# Patient Record
Sex: FEMALE | Race: WHITE | Employment: OTHER | URBAN - METROPOLITAN AREA
[De-identification: names, ages, dates, MRNs, and addresses within clinical notes are randomized per-mention and may not be internally consistent; named-entity substitution may affect disease eponyms.]

---

## 2017-07-08 ENCOUNTER — HOSPITAL ENCOUNTER (EMERGENCY)
Age: 82
Discharge: HOME | End: 2017-07-08
Payer: MEDICARE

## 2017-07-08 VITALS
TEMPERATURE: 98.2 F | BODY MASS INDEX: 23.92 KG/M2 | HEART RATE: 89 BPM | DIASTOLIC BLOOD PRESSURE: 63 MMHG | HEIGHT: 62 IN | RESPIRATION RATE: 16 BRPM | OXYGEN SATURATION: 99 % | SYSTOLIC BLOOD PRESSURE: 159 MMHG | WEIGHT: 130 LBS

## 2017-07-08 DIAGNOSIS — L23.9 ALLERGIC CONTACT DERMATITIS, UNSPECIFIED TRIGGER: Primary | ICD-10-CM

## 2017-07-08 PROCEDURE — 96372 THER/PROPH/DIAG INJ SC/IM: CPT

## 2017-07-08 PROCEDURE — 6360000002 HC RX W HCPCS: Performed by: NURSE PRACTITIONER

## 2017-07-08 PROCEDURE — 99282 EMERGENCY DEPT VISIT SF MDM: CPT

## 2017-07-08 RX ORDER — LEVOTHYROXINE SODIUM 0.07 MG/1
75 TABLET ORAL DAILY
COMMUNITY

## 2017-07-08 RX ORDER — TRIAMCINOLONE ACETONIDE 40 MG/ML
80 INJECTION, SUSPENSION INTRA-ARTICULAR; INTRAMUSCULAR ONCE
Status: COMPLETED | OUTPATIENT
Start: 2017-07-08 | End: 2017-07-08

## 2017-07-08 RX ORDER — PREDNISONE 10 MG/1
TABLET ORAL
Qty: 30 TABLET | Refills: 0 | Status: SHIPPED | OUTPATIENT
Start: 2017-07-08

## 2017-07-08 RX ADMIN — TRIAMCINOLONE ACETONIDE 80 MG: 40 INJECTION, SUSPENSION INTRA-ARTICULAR; INTRAMUSCULAR at 01:27

## 2023-10-13 ENCOUNTER — APPOINTMENT (OUTPATIENT)
Dept: CT IMAGING | Age: 88
DRG: 481 | End: 2023-10-13
Payer: MEDICARE

## 2023-10-13 ENCOUNTER — HOSPITAL ENCOUNTER (INPATIENT)
Age: 88
LOS: 7 days | Discharge: NURSING, REHAB OR LONG-TERM CARE | DRG: 481 | End: 2023-10-20
Attending: SURGERY | Admitting: SURGERY
Payer: MEDICARE

## 2023-10-13 ENCOUNTER — APPOINTMENT (OUTPATIENT)
Dept: GENERAL RADIOLOGY | Age: 88
DRG: 481 | End: 2023-10-13
Payer: MEDICARE

## 2023-10-13 DIAGNOSIS — S72.011A CLOSED SUBCAPITAL FRACTURE OF FEMUR, RIGHT, INITIAL ENCOUNTER (HCC): ICD-10-CM

## 2023-10-13 DIAGNOSIS — S72.001A CLOSED FRACTURE OF RIGHT HIP, INITIAL ENCOUNTER (HCC): Primary | ICD-10-CM

## 2023-10-13 DIAGNOSIS — R94.31 T WAVE INVERSION IN EKG: ICD-10-CM

## 2023-10-13 LAB
ALBUMIN SERPL-MCNC: 4.2 G/DL (ref 3.5–4.6)
ALP SERPL-CCNC: 68 U/L (ref 40–130)
ALT SERPL-CCNC: 13 U/L (ref 0–33)
AMPHET UR QL SCN: NORMAL
ANION GAP SERPL CALCULATED.3IONS-SCNC: 9 MEQ/L (ref 9–15)
AST SERPL-CCNC: 23 U/L (ref 0–35)
BARBITURATES UR QL SCN: NORMAL
BASOPHILS # BLD: 0 K/UL (ref 0–0.2)
BASOPHILS NFR BLD: 0.3 %
BENZODIAZ UR QL SCN: NORMAL
BILIRUB SERPL-MCNC: 1.2 MG/DL (ref 0.2–0.7)
BILIRUB UR QL STRIP: NEGATIVE
BUN SERPL-MCNC: 14 MG/DL (ref 8–23)
CALCIUM SERPL-MCNC: 9.5 MG/DL (ref 8.5–9.9)
CANNABINOIDS UR QL SCN: NORMAL
CHLORIDE SERPL-SCNC: 104 MEQ/L (ref 95–107)
CLARITY UR: CLEAR
CO2 SERPL-SCNC: 28 MEQ/L (ref 20–31)
COCAINE UR QL SCN: NORMAL
COLOR UR: YELLOW
CREAT SERPL-MCNC: 0.87 MG/DL (ref 0.5–0.9)
DRUG SCREEN COMMENT UR-IMP: NORMAL
EOSINOPHIL # BLD: 0 K/UL (ref 0–0.7)
EOSINOPHIL NFR BLD: 0.4 %
ERYTHROCYTE [DISTWIDTH] IN BLOOD BY AUTOMATED COUNT: 12.3 % (ref 11.5–14.5)
FENTANYL SCREEN, URINE: NORMAL
GLOBULIN SER CALC-MCNC: 3 G/DL (ref 2.3–3.5)
GLUCOSE SERPL-MCNC: 101 MG/DL (ref 70–99)
GLUCOSE UR STRIP-MCNC: NEGATIVE MG/DL
HCT VFR BLD AUTO: 46.3 % (ref 37–47)
HGB BLD-MCNC: 14.6 G/DL (ref 12–16)
HGB UR QL STRIP: NEGATIVE
KETONES UR STRIP-MCNC: 15 MG/DL
LEUKOCYTE ESTERASE UR QL STRIP: NEGATIVE
LYMPHOCYTES # BLD: 1.5 K/UL (ref 1–4.8)
LYMPHOCYTES NFR BLD: 15 %
MAGNESIUM SERPL-MCNC: 2.1 MG/DL (ref 1.7–2.4)
MCH RBC QN AUTO: 29.4 PG (ref 27–31.3)
MCHC RBC AUTO-ENTMCNC: 31.5 % (ref 33–37)
MCV RBC AUTO: 93.3 FL (ref 79.4–94.8)
METHADONE UR QL SCN: NORMAL
MONOCYTES # BLD: 0.6 K/UL (ref 0.2–0.8)
MONOCYTES NFR BLD: 6.1 %
NEUTROPHILS # BLD: 7.5 K/UL (ref 1.4–6.5)
NEUTS SEG NFR BLD: 77.6 %
NITRITE UR QL STRIP: NEGATIVE
OPIATES UR QL SCN: NORMAL
OXYCODONE UR QL SCN: NORMAL
PCP UR QL SCN: NORMAL
PH UR STRIP: 6.5 [PH] (ref 5–9)
PLATELET # BLD AUTO: 212 K/UL (ref 130–400)
POTASSIUM SERPL-SCNC: 4.3 MEQ/L (ref 3.4–4.9)
PROPOXYPH UR QL SCN: NORMAL
PROT SERPL-MCNC: 7.2 G/DL (ref 6.3–8)
PROT UR STRIP-MCNC: ABNORMAL MG/DL
RBC # BLD AUTO: 4.96 M/UL (ref 4.2–5.4)
SODIUM SERPL-SCNC: 141 MEQ/L (ref 135–144)
SP GR UR STRIP: 1.01 (ref 1–1.03)
TROPONIN, HIGH SENSITIVITY: 15 NG/L (ref 0–19)
URINE REFLEX TO CULTURE: ABNORMAL
UROBILINOGEN UR STRIP-ACNC: 1 E.U./DL
WBC # BLD AUTO: 9.7 K/UL (ref 4.8–10.8)

## 2023-10-13 PROCEDURE — 80307 DRUG TEST PRSMV CHEM ANLYZR: CPT

## 2023-10-13 PROCEDURE — 83735 ASSAY OF MAGNESIUM: CPT

## 2023-10-13 PROCEDURE — 72110 X-RAY EXAM L-2 SPINE 4/>VWS: CPT

## 2023-10-13 PROCEDURE — 80053 COMPREHEN METABOLIC PANEL: CPT

## 2023-10-13 PROCEDURE — 2580000003 HC RX 258: Performed by: PHYSICIAN ASSISTANT

## 2023-10-13 PROCEDURE — 73552 X-RAY EXAM OF FEMUR 2/>: CPT

## 2023-10-13 PROCEDURE — 81003 URINALYSIS AUTO W/O SCOPE: CPT

## 2023-10-13 PROCEDURE — 99285 EMERGENCY DEPT VISIT HI MDM: CPT

## 2023-10-13 PROCEDURE — 84484 ASSAY OF TROPONIN QUANT: CPT

## 2023-10-13 PROCEDURE — 93005 ELECTROCARDIOGRAM TRACING: CPT | Performed by: PHYSICIAN ASSISTANT

## 2023-10-13 PROCEDURE — 6370000000 HC RX 637 (ALT 250 FOR IP): Performed by: PHYSICIAN ASSISTANT

## 2023-10-13 PROCEDURE — 73502 X-RAY EXAM HIP UNI 2-3 VIEWS: CPT

## 2023-10-13 PROCEDURE — 73700 CT LOWER EXTREMITY W/O DYE: CPT

## 2023-10-13 PROCEDURE — 71045 X-RAY EXAM CHEST 1 VIEW: CPT

## 2023-10-13 PROCEDURE — 1210000000 HC MED SURG R&B

## 2023-10-13 PROCEDURE — 85025 COMPLETE CBC W/AUTO DIFF WBC: CPT

## 2023-10-13 PROCEDURE — 36415 COLL VENOUS BLD VENIPUNCTURE: CPT

## 2023-10-13 RX ORDER — SENNA AND DOCUSATE SODIUM 50; 8.6 MG/1; MG/1
1 TABLET, FILM COATED ORAL 2 TIMES DAILY
Status: DISCONTINUED | OUTPATIENT
Start: 2023-10-13 | End: 2023-10-20 | Stop reason: HOSPADM

## 2023-10-13 RX ORDER — OXYCODONE HYDROCHLORIDE 5 MG/1
5 TABLET ORAL EVERY 4 HOURS PRN
Status: DISCONTINUED | OUTPATIENT
Start: 2023-10-13 | End: 2023-10-18

## 2023-10-13 RX ORDER — POLYETHYLENE GLYCOL 3350 17 G/17G
17 POWDER, FOR SOLUTION ORAL DAILY
Status: DISCONTINUED | OUTPATIENT
Start: 2023-10-14 | End: 2023-10-20 | Stop reason: HOSPADM

## 2023-10-13 RX ORDER — SODIUM CHLORIDE 0.9 % (FLUSH) 0.9 %
5-40 SYRINGE (ML) INJECTION EVERY 12 HOURS SCHEDULED
Status: DISCONTINUED | OUTPATIENT
Start: 2023-10-13 | End: 2023-10-20 | Stop reason: HOSPADM

## 2023-10-13 RX ORDER — TRAMADOL HYDROCHLORIDE 50 MG/1
50 TABLET ORAL ONCE
Status: COMPLETED | OUTPATIENT
Start: 2023-10-13 | End: 2023-10-13

## 2023-10-13 RX ORDER — ACETAMINOPHEN 325 MG/1
650 TABLET ORAL EVERY 6 HOURS
Status: DISCONTINUED | OUTPATIENT
Start: 2023-10-13 | End: 2023-10-20 | Stop reason: HOSPADM

## 2023-10-13 RX ORDER — SODIUM CHLORIDE 0.9 % (FLUSH) 0.9 %
5-40 SYRINGE (ML) INJECTION PRN
Status: DISCONTINUED | OUTPATIENT
Start: 2023-10-13 | End: 2023-10-20 | Stop reason: HOSPADM

## 2023-10-13 RX ORDER — ONDANSETRON 4 MG/1
4 TABLET, ORALLY DISINTEGRATING ORAL EVERY 8 HOURS PRN
Status: DISCONTINUED | OUTPATIENT
Start: 2023-10-13 | End: 2023-10-20 | Stop reason: HOSPADM

## 2023-10-13 RX ORDER — ONDANSETRON 2 MG/ML
4 INJECTION INTRAMUSCULAR; INTRAVENOUS EVERY 8 HOURS PRN
Status: DISCONTINUED | OUTPATIENT
Start: 2023-10-13 | End: 2023-10-20 | Stop reason: HOSPADM

## 2023-10-13 RX ORDER — LEVOTHYROXINE SODIUM 0.07 MG/1
75 TABLET ORAL DAILY
Status: DISCONTINUED | OUTPATIENT
Start: 2023-10-14 | End: 2023-10-20 | Stop reason: HOSPADM

## 2023-10-13 RX ORDER — SODIUM CHLORIDE, SODIUM LACTATE, POTASSIUM CHLORIDE, CALCIUM CHLORIDE 600; 310; 30; 20 MG/100ML; MG/100ML; MG/100ML; MG/100ML
INJECTION, SOLUTION INTRAVENOUS CONTINUOUS
Status: DISCONTINUED | OUTPATIENT
Start: 2023-10-13 | End: 2023-10-14

## 2023-10-13 RX ORDER — OXYCODONE HYDROCHLORIDE 5 MG/1
2.5 TABLET ORAL EVERY 4 HOURS PRN
Status: DISCONTINUED | OUTPATIENT
Start: 2023-10-13 | End: 2023-10-18

## 2023-10-13 RX ORDER — SODIUM CHLORIDE 9 MG/ML
INJECTION, SOLUTION INTRAVENOUS PRN
Status: DISCONTINUED | OUTPATIENT
Start: 2023-10-13 | End: 2023-10-20 | Stop reason: HOSPADM

## 2023-10-13 RX ADMIN — TRAMADOL HYDROCHLORIDE 50 MG: 50 TABLET ORAL at 14:39

## 2023-10-13 RX ADMIN — ACETAMINOPHEN 650 MG: 325 TABLET ORAL at 22:37

## 2023-10-13 RX ADMIN — Medication 10 ML: at 22:33

## 2023-10-13 RX ADMIN — SODIUM CHLORIDE, POTASSIUM CHLORIDE, SODIUM LACTATE AND CALCIUM CHLORIDE: 600; 310; 30; 20 INJECTION, SOLUTION INTRAVENOUS at 22:35

## 2023-10-13 ASSESSMENT — ENCOUNTER SYMPTOMS
RHINORRHEA: 0
BACK PAIN: 0
ABDOMINAL PAIN: 0
PHOTOPHOBIA: 0
DIARRHEA: 0
VOMITING: 0
SHORTNESS OF BREATH: 0
SORE THROAT: 0
EYE PAIN: 0
NAUSEA: 0
COUGH: 0

## 2023-10-13 ASSESSMENT — PAIN DESCRIPTION - ORIENTATION
ORIENTATION: RIGHT

## 2023-10-13 ASSESSMENT — PAIN DESCRIPTION - LOCATION
LOCATION: HIP
LOCATION: LEG

## 2023-10-13 ASSESSMENT — PAIN DESCRIPTION - PAIN TYPE: TYPE: ACUTE PAIN

## 2023-10-13 ASSESSMENT — PAIN SCALES - GENERAL
PAINLEVEL_OUTOF10: 1
PAINLEVEL_OUTOF10: 8

## 2023-10-13 ASSESSMENT — PAIN - FUNCTIONAL ASSESSMENT
PAIN_FUNCTIONAL_ASSESSMENT: 0-10
PAIN_FUNCTIONAL_ASSESSMENT: 0-10

## 2023-10-13 ASSESSMENT — PAIN DESCRIPTION - ONSET: ONSET: ON-GOING

## 2023-10-13 ASSESSMENT — PAIN DESCRIPTION - DESCRIPTORS
DESCRIPTORS: THROBBING
DESCRIPTORS: THROBBING

## 2023-10-13 ASSESSMENT — PAIN DESCRIPTION - FREQUENCY: FREQUENCY: CONTINUOUS

## 2023-10-13 NOTE — ED TRIAGE NOTES
Pt c/o fall from standing. Pt c/o pain in rt hip pain. Pt is A&OX4, skin intact, afebrile, breaths are equal and unlabored.

## 2023-10-13 NOTE — ED PROVIDER NOTES
[facility] ED  eMERGENCY dEPARTMENTeNCOUnter      Pt Name: Samuel Chowdhury  MRN: [id number]  [address] 11/3/1929  Date ofevaluation: 10/13/2023  Provider: See Rollins PA-C    CHIEF COMPLAINT       Chief Complaint   Patient presents with    Fall     Rt hip pain          HISTORY OF PRESENT ILLNESS   (Location/Symptom, Timing/Onset,Context/Setting, Quality, Duration, Modifying Factors, Severity)  Note limiting factors. Samuel Chowdhury is a 80 y.o. female who presents to the emergency department witnessed fall. Patient was outside and fell landing on her right hip. She denies any preceding chest pain shortness of breath dizziness or syncope. She did not hit her head. She complains of right hip pain with limited mobility. HPI    NursingNotes were reviewed. REVIEW OF SYSTEMS    (2-9 systems for level 4, 10 or more for level 5)     Review of Systems   Constitutional:  Negative for chills, diaphoresis, fatigue and fever. HENT:  Negative for congestion, rhinorrhea and sore throat. Eyes:  Negative for photophobia and pain. Respiratory:  Negative for cough and shortness of breath. Cardiovascular:  Negative for chest pain and palpitations. Gastrointestinal:  Negative for abdominal pain, diarrhea, nausea and vomiting. Genitourinary:  Negative for dysuria and flank pain. Musculoskeletal:  Positive for arthralgias and gait problem. Negative for back pain. Skin:  Negative for rash. Neurological:  Negative for dizziness, light-headedness and headaches. Psychiatric/Behavioral: Negative. All other systems reviewed and are negative. Except as noted above the remainder of the review of systems was reviewed and negative.        PAST MEDICAL HISTORY     Past Medical History:   Diagnosis Date    Thyroid disease          SURGICALHISTORY       Past Surgical History:   Procedure Laterality Date    FRACTURE SURGERY      R ankle          CURRENT MEDICATIONS       Previous Medications Skin:     General: Skin is warm and dry. Capillary Refill: Capillary refill takes less than 2 seconds. Findings: No rash. Neurological:      Mental Status: She is alert and oriented to person, place, and time. Psychiatric:         Thought Content: Thought content normal.         Judgment: Judgment normal.         RESULTS     EKG: All EKG's are interpreted by the Emergency Department Physician who either signs or Co-signsthis chart in the absence of a cardiologist.    Nsr 72bpm t wave inversions v3,v4 v5 v6 no priors to compare to.  Denies chest pain    RADIOLOGY:   Non-plain filmimages such as CT, Ultrasound and MRI are read by the radiologist. Plain radiographic images are visualized and preliminarily interpreted by the emergency physician with the below findings:    Elijah Johnston subcapital right hip fx will obtain ct for confirmation     Interpretation per the Radiologist below, if available at the time ofthis note:    CT HIP RIGHT WO CONTRAST   Final Result   Subcapital fracture of the right femur with slight impaction         XR HIP 2-3 VW W PELVIS RIGHT   Final Result   Question subcapital fracture of the right femur         XR FEMUR RIGHT (MIN 2 VIEWS)   Final Result   Question fracture of the right femoral neck         XR LUMBAR SPINE (MIN 4 VIEWS)   Final Result   No fracture         XR CHEST PORTABLE    (Results Pending)         ED BEDSIDE ULTRASOUND:   Performed by ED Physician - none    LABS:  Labs Reviewed   CBC WITH AUTO DIFFERENTIAL - Abnormal; Notable for the following components:       Result Value    MCHC 31.5 (*)     Neutrophils Absolute 7.5 (*)     All other components within normal limits   COMPREHENSIVE METABOLIC PANEL - Abnormal; Notable for the following components:    Glucose 101 (*)     Total Bilirubin 1.2 (*)     All other components within normal limits   TROPONIN   MAGNESIUM   URINALYSIS WITH REFLEX TO CULTURE   PROTIME-INR   APTT   URINE DRUG SCREEN   ETHANOL   TROPONIN

## 2023-10-14 ENCOUNTER — ANESTHESIA (OUTPATIENT)
Dept: OPERATING ROOM | Age: 88
End: 2023-10-14
Payer: MEDICARE

## 2023-10-14 ENCOUNTER — ANESTHESIA EVENT (OUTPATIENT)
Dept: OPERATING ROOM | Age: 88
End: 2023-10-14
Payer: MEDICARE

## 2023-10-14 ENCOUNTER — APPOINTMENT (OUTPATIENT)
Dept: GENERAL RADIOLOGY | Age: 88
DRG: 481 | End: 2023-10-14
Payer: MEDICARE

## 2023-10-14 LAB
ABO + RH BLD: NORMAL
ANION GAP SERPL CALCULATED.3IONS-SCNC: 11 MEQ/L (ref 9–15)
APTT PPP: 34.4 SEC (ref 24.4–36.8)
BLD GP AB SCN SERPL QL: NORMAL
BUN SERPL-MCNC: 21 MG/DL (ref 8–23)
CALCIUM SERPL-MCNC: 8.6 MG/DL (ref 8.5–9.9)
CHLORIDE SERPL-SCNC: 103 MEQ/L (ref 95–107)
CO2 SERPL-SCNC: 25 MEQ/L (ref 20–31)
CREAT SERPL-MCNC: 0.88 MG/DL (ref 0.5–0.9)
EKG ATRIAL RATE: 72 BPM
EKG P AXIS: 38 DEGREES
EKG P-R INTERVAL: 184 MS
EKG Q-T INTERVAL: 406 MS
EKG QRS DURATION: 82 MS
EKG QTC CALCULATION (BAZETT): 444 MS
EKG R AXIS: -20 DEGREES
EKG T AXIS: 92 DEGREES
EKG VENTRICULAR RATE: 72 BPM
ERYTHROCYTE [DISTWIDTH] IN BLOOD BY AUTOMATED COUNT: 12.4 % (ref 11.5–14.5)
ETHANOL PERCENT: NORMAL G/DL
ETHANOLAMINE SERPL-MCNC: <10 MG/DL (ref 0–0.08)
GLUCOSE SERPL-MCNC: 102 MG/DL (ref 70–99)
HCT VFR BLD AUTO: 38.9 % (ref 37–47)
HGB BLD-MCNC: 12.5 G/DL (ref 12–16)
INR PPP: 1.2
MCH RBC QN AUTO: 29.3 PG (ref 27–31.3)
MCHC RBC AUTO-ENTMCNC: 32.1 % (ref 33–37)
MCV RBC AUTO: 91.1 FL (ref 79.4–94.8)
PLATELET # BLD AUTO: 189 K/UL (ref 130–400)
POTASSIUM SERPL-SCNC: 3.9 MEQ/L (ref 3.4–4.9)
PROTHROMBIN TIME: 15.3 SEC (ref 12.3–14.9)
RBC # BLD AUTO: 4.27 M/UL (ref 4.2–5.4)
SODIUM SERPL-SCNC: 139 MEQ/L (ref 135–144)
TROPONIN, HIGH SENSITIVITY: 17 NG/L (ref 0–19)
WBC # BLD AUTO: 9.1 K/UL (ref 4.8–10.8)

## 2023-10-14 PROCEDURE — 2580000003 HC RX 258: Performed by: ORTHOPAEDIC SURGERY

## 2023-10-14 PROCEDURE — 85027 COMPLETE CBC AUTOMATED: CPT

## 2023-10-14 PROCEDURE — 6370000000 HC RX 637 (ALT 250 FOR IP): Performed by: PHYSICIAN ASSISTANT

## 2023-10-14 PROCEDURE — 6360000002 HC RX W HCPCS: Performed by: ORTHOPAEDIC SURGERY

## 2023-10-14 PROCEDURE — 82077 ASSAY SPEC XCP UR&BREATH IA: CPT

## 2023-10-14 PROCEDURE — 84484 ASSAY OF TROPONIN QUANT: CPT

## 2023-10-14 PROCEDURE — 3700000000 HC ANESTHESIA ATTENDED CARE: Performed by: ORTHOPAEDIC SURGERY

## 2023-10-14 PROCEDURE — 99221 1ST HOSP IP/OBS SF/LOW 40: CPT | Performed by: ORTHOPAEDIC SURGERY

## 2023-10-14 PROCEDURE — C1713 ANCHOR/SCREW BN/BN,TIS/BN: HCPCS | Performed by: ORTHOPAEDIC SURGERY

## 2023-10-14 PROCEDURE — 86850 RBC ANTIBODY SCREEN: CPT

## 2023-10-14 PROCEDURE — 80048 BASIC METABOLIC PNL TOTAL CA: CPT

## 2023-10-14 PROCEDURE — 86900 BLOOD TYPING SEROLOGIC ABO: CPT

## 2023-10-14 PROCEDURE — 2709999900 HC NON-CHARGEABLE SUPPLY: Performed by: ORTHOPAEDIC SURGERY

## 2023-10-14 PROCEDURE — 0QS604Z REPOSITION RIGHT UPPER FEMUR WITH INTERNAL FIXATION DEVICE, OPEN APPROACH: ICD-10-PCS | Performed by: ORTHOPAEDIC SURGERY

## 2023-10-14 PROCEDURE — 3600000004 HC SURGERY LEVEL 4 BASE: Performed by: ORTHOPAEDIC SURGERY

## 2023-10-14 PROCEDURE — 2720000010 HC SURG SUPPLY STERILE: Performed by: ORTHOPAEDIC SURGERY

## 2023-10-14 PROCEDURE — 93010 ELECTROCARDIOGRAM REPORT: CPT | Performed by: INTERNAL MEDICINE

## 2023-10-14 PROCEDURE — 3700000001 HC ADD 15 MINUTES (ANESTHESIA): Performed by: ORTHOPAEDIC SURGERY

## 2023-10-14 PROCEDURE — 7100000000 HC PACU RECOVERY - FIRST 15 MIN: Performed by: ORTHOPAEDIC SURGERY

## 2023-10-14 PROCEDURE — 6370000000 HC RX 637 (ALT 250 FOR IP)

## 2023-10-14 PROCEDURE — 36415 COLL VENOUS BLD VENIPUNCTURE: CPT

## 2023-10-14 PROCEDURE — 64447 NJX AA&/STRD FEMORAL NRV IMG: CPT | Performed by: STUDENT IN AN ORGANIZED HEALTH CARE EDUCATION/TRAINING PROGRAM

## 2023-10-14 PROCEDURE — 7100000001 HC PACU RECOVERY - ADDTL 15 MIN: Performed by: ORTHOPAEDIC SURGERY

## 2023-10-14 PROCEDURE — A4217 STERILE WATER/SALINE, 500 ML: HCPCS | Performed by: ORTHOPAEDIC SURGERY

## 2023-10-14 PROCEDURE — 2580000003 HC RX 258: Performed by: PHYSICIAN ASSISTANT

## 2023-10-14 PROCEDURE — 3600000014 HC SURGERY LEVEL 4 ADDTL 15MIN: Performed by: ORTHOPAEDIC SURGERY

## 2023-10-14 PROCEDURE — 1210000000 HC MED SURG R&B

## 2023-10-14 PROCEDURE — 85730 THROMBOPLASTIN TIME PARTIAL: CPT

## 2023-10-14 PROCEDURE — 2580000003 HC RX 258: Performed by: STUDENT IN AN ORGANIZED HEALTH CARE EDUCATION/TRAINING PROGRAM

## 2023-10-14 PROCEDURE — 27235 TREAT THIGH FRACTURE: CPT | Performed by: ORTHOPAEDIC SURGERY

## 2023-10-14 PROCEDURE — 85610 PROTHROMBIN TIME: CPT

## 2023-10-14 PROCEDURE — 86901 BLOOD TYPING SEROLOGIC RH(D): CPT

## 2023-10-14 PROCEDURE — C1769 GUIDE WIRE: HCPCS | Performed by: ORTHOPAEDIC SURGERY

## 2023-10-14 PROCEDURE — 6360000002 HC RX W HCPCS: Performed by: STUDENT IN AN ORGANIZED HEALTH CARE EDUCATION/TRAINING PROGRAM

## 2023-10-14 DEVICE — SCREW BNE L90MM DIA6.5MM THRD L16MM CANC S STL SELF DRL ST: Type: IMPLANTABLE DEVICE | Site: HIP | Status: FUNCTIONAL

## 2023-10-14 DEVICE — WASHER ORTH DIA13MM FOR CANN SCR: Type: IMPLANTABLE DEVICE | Site: HIP | Status: FUNCTIONAL

## 2023-10-14 DEVICE — SCREW BNE L85MM DIA6.5MM THRD L16MM CANC S STL SELF DRL ST: Type: IMPLANTABLE DEVICE | Site: HIP | Status: FUNCTIONAL

## 2023-10-14 DEVICE — SCREW BNE L80MM DIA6.5MM THRD L16MM CANC S STL SELF DRL ST: Type: IMPLANTABLE DEVICE | Site: HIP | Status: FUNCTIONAL

## 2023-10-14 RX ORDER — SODIUM CHLORIDE 0.9 % (FLUSH) 0.9 %
5-40 SYRINGE (ML) INJECTION PRN
Status: DISCONTINUED | OUTPATIENT
Start: 2023-10-14 | End: 2023-10-14 | Stop reason: HOSPADM

## 2023-10-14 RX ORDER — SODIUM CHLORIDE 0.9 % (FLUSH) 0.9 %
5-40 SYRINGE (ML) INJECTION EVERY 12 HOURS SCHEDULED
Status: DISCONTINUED | OUTPATIENT
Start: 2023-10-14 | End: 2023-10-14 | Stop reason: HOSPADM

## 2023-10-14 RX ORDER — PROPOFOL 10 MG/ML
INJECTION, EMULSION INTRAVENOUS PRN
Status: DISCONTINUED | OUTPATIENT
Start: 2023-10-14 | End: 2023-10-14 | Stop reason: SDUPTHER

## 2023-10-14 RX ORDER — BUPIVACAINE HYDROCHLORIDE 5 MG/ML
INJECTION, SOLUTION EPIDURAL; INTRACAUDAL
Status: COMPLETED | OUTPATIENT
Start: 2023-10-14 | End: 2023-10-14

## 2023-10-14 RX ORDER — LIDOCAINE HYDROCHLORIDE 10 MG/ML
1 INJECTION, SOLUTION EPIDURAL; INFILTRATION; INTRACAUDAL; PERINEURAL
Status: DISCONTINUED | OUTPATIENT
Start: 2023-10-14 | End: 2023-10-14 | Stop reason: HOSPADM

## 2023-10-14 RX ORDER — MAGNESIUM HYDROXIDE 1200 MG/15ML
LIQUID ORAL CONTINUOUS PRN
Status: DISCONTINUED | OUTPATIENT
Start: 2023-10-14 | End: 2023-10-14 | Stop reason: HOSPADM

## 2023-10-14 RX ORDER — ONDANSETRON 2 MG/ML
4 INJECTION INTRAMUSCULAR; INTRAVENOUS
Status: DISCONTINUED | OUTPATIENT
Start: 2023-10-14 | End: 2023-10-14 | Stop reason: HOSPADM

## 2023-10-14 RX ORDER — FENTANYL CITRATE 0.05 MG/ML
50 INJECTION, SOLUTION INTRAMUSCULAR; INTRAVENOUS EVERY 10 MIN PRN
Status: DISCONTINUED | OUTPATIENT
Start: 2023-10-14 | End: 2023-10-14 | Stop reason: HOSPADM

## 2023-10-14 RX ORDER — SODIUM CHLORIDE, SODIUM LACTATE, POTASSIUM CHLORIDE, CALCIUM CHLORIDE 600; 310; 30; 20 MG/100ML; MG/100ML; MG/100ML; MG/100ML
INJECTION, SOLUTION INTRAVENOUS CONTINUOUS PRN
Status: DISCONTINUED | OUTPATIENT
Start: 2023-10-14 | End: 2023-10-14 | Stop reason: SDUPTHER

## 2023-10-14 RX ORDER — LIDOCAINE 4 G/G
2 PATCH TOPICAL DAILY
Status: DISCONTINUED | OUTPATIENT
Start: 2023-10-14 | End: 2023-10-14

## 2023-10-14 RX ORDER — ENOXAPARIN SODIUM 100 MG/ML
30 INJECTION SUBCUTANEOUS 2 TIMES DAILY
Status: DISCONTINUED | OUTPATIENT
Start: 2023-10-15 | End: 2023-10-15

## 2023-10-14 RX ORDER — LIDOCAINE 4 G/G
1 PATCH TOPICAL DAILY
Status: DISCONTINUED | OUTPATIENT
Start: 2023-10-14 | End: 2023-10-20 | Stop reason: HOSPADM

## 2023-10-14 RX ORDER — DIPHENHYDRAMINE HYDROCHLORIDE 50 MG/ML
12.5 INJECTION INTRAMUSCULAR; INTRAVENOUS
Status: DISCONTINUED | OUTPATIENT
Start: 2023-10-14 | End: 2023-10-14 | Stop reason: HOSPADM

## 2023-10-14 RX ORDER — SODIUM CHLORIDE 9 MG/ML
INJECTION, SOLUTION INTRAVENOUS PRN
Status: DISCONTINUED | OUTPATIENT
Start: 2023-10-14 | End: 2023-10-14 | Stop reason: HOSPADM

## 2023-10-14 RX ORDER — METOCLOPRAMIDE HYDROCHLORIDE 5 MG/ML
10 INJECTION INTRAMUSCULAR; INTRAVENOUS
Status: DISCONTINUED | OUTPATIENT
Start: 2023-10-14 | End: 2023-10-14 | Stop reason: HOSPADM

## 2023-10-14 RX ORDER — OXYCODONE HYDROCHLORIDE 5 MG/1
5 CAPSULE ORAL
Status: DISCONTINUED | OUTPATIENT
Start: 2023-10-14 | End: 2023-10-14 | Stop reason: HOSPADM

## 2023-10-14 RX ADMIN — CEFAZOLIN 2000 MG: 2 INJECTION, POWDER, FOR SOLUTION INTRAMUSCULAR; INTRAVENOUS at 18:17

## 2023-10-14 RX ADMIN — ACETAMINOPHEN 650 MG: 325 TABLET ORAL at 18:15

## 2023-10-14 RX ADMIN — PROPOFOL 30 MG: 10 INJECTION, EMULSION INTRAVENOUS at 11:13

## 2023-10-14 RX ADMIN — LEVOTHYROXINE SODIUM 75 MCG: 0.07 TABLET ORAL at 05:10

## 2023-10-14 RX ADMIN — PROPOFOL 30 MG: 10 INJECTION, EMULSION INTRAVENOUS at 11:29

## 2023-10-14 RX ADMIN — PROPOFOL 30 MG: 10 INJECTION, EMULSION INTRAVENOUS at 10:37

## 2023-10-14 RX ADMIN — ACETAMINOPHEN 650 MG: 325 TABLET ORAL at 22:22

## 2023-10-14 RX ADMIN — CEFAZOLIN 2000 MG: 2 INJECTION, POWDER, FOR SOLUTION INTRAMUSCULAR; INTRAVENOUS at 10:52

## 2023-10-14 RX ADMIN — Medication 5 ML: at 21:00

## 2023-10-14 RX ADMIN — BUPIVACAINE HYDROCHLORIDE 20 ML: 5 INJECTION, SOLUTION EPIDURAL; INTRACAUDAL at 10:37

## 2023-10-14 RX ADMIN — SENNOSIDES, DOCUSATE SODIUM 1 TABLET: 8.6; 5 TABLET ORAL at 22:22

## 2023-10-14 RX ADMIN — SODIUM CHLORIDE, POTASSIUM CHLORIDE, SODIUM LACTATE AND CALCIUM CHLORIDE: 600; 310; 30; 20 INJECTION, SOLUTION INTRAVENOUS at 10:37

## 2023-10-14 RX ADMIN — PROPOFOL 30 MG: 10 INJECTION, EMULSION INTRAVENOUS at 10:56

## 2023-10-14 RX ADMIN — ACETAMINOPHEN 650 MG: 325 TABLET ORAL at 05:10

## 2023-10-14 RX ADMIN — SODIUM CHLORIDE, POTASSIUM CHLORIDE, SODIUM LACTATE AND CALCIUM CHLORIDE: 600; 310; 30; 20 INJECTION, SOLUTION INTRAVENOUS at 15:27

## 2023-10-14 RX ADMIN — BUPIVACAINE HYDROCHLORIDE 10 MG: 5 INJECTION, SOLUTION EPIDURAL; INTRACAUDAL; PERINEURAL at 10:37

## 2023-10-14 ASSESSMENT — ENCOUNTER SYMPTOMS
COUGH: 0
NAUSEA: 0
BACK PAIN: 0
STRIDOR: 0
EYE PAIN: 0
VOMITING: 0
SHORTNESS OF BREATH: 0
WHEEZING: 0

## 2023-10-14 ASSESSMENT — PAIN SCALES - GENERAL
PAINLEVEL_OUTOF10: 0
PAINLEVEL_OUTOF10: 1

## 2023-10-14 NOTE — OP NOTE
Operative Note      Patient: Evelio Brewer  YOB: 1929  MRN: [id number]    Date of Procedure: 10/14/2023      Preoperative diagnosis: Minimally displaced right femoral neck fracture    Postoperative diagnosis: Same    Procedure planned: Percutaneous screw fixation right femoral neck fracture    Procedure performed: Same    Surgeon: Khushboo Gomez D.O. Assistant: Tanya TSAUFFER  The physician assistant was present through the entire case. Given the nature of the disease process and the procedure to be performed a skilled surgical assistant was necessary during the case. The assistant was necessary in order to hold retractors and directly assist in the operation. A certified scrub tech was at the back table managing instruments and supplies for the surgical case. Anesthesia: Spinal anesthesia with sedation    Estimated blood loss: Approximately 50 cc    Drains: None    Tourniquet: None    Specimens: None    Implants: Synthes 6.5 cannulated    Indications: The patient sustained a mechanical fall injuring the right hip. The patient had pain and inability to ambulate. Imaging demonstrated minimal displaced right femoral neck fracture. Treatment options were discussed including both operative and nonoperative strategies. The patient elected to proceed forth with surgery by way of percutaneous screw fixation. Informed consent was signed and placed in the chart. Complications: None noted at time of surgery. Description of operation: The patient was taken to the operative suite. A timeout was performed and the right hip confirmed to be the operative site. The patient was administered appropriate IV antibiotics and sedation to augment the spinal anesthesia placed in the preoperative holding area. The patient was then transferred from the hospital bed to the fracture table and secured in a well-padded fashion.   The right lower extremity was placed into the traction boot in a well-padded fashion. The left lower extremity was placed in the Elliot lithotomy position in a well-padded fashion. Fluoroscopic imaging was brought in. Adequate alignment was confirmed through the zone of injury. The patient was prepped and draped in the normal sterile fashion. The 10 blade was used to incise skin about the lateral thigh. Sharp dissection was carried through the subcutaneous plane splitting the iliotibial fascia and dissecting directly to bone. The guidepin for the inferior screw was then started at the lateral femur and advanced in the appropriate trajectory to buttress the cortical bone in the inferior neck. Guidepins for the superior screws including anterior and posterior were then sequentially placed achieving nice spread and position. Feeling satisfied with guidepin positioning as judged in AP and lateral planes standard techniques were used to over drill measure and insert the cannulated screws. Good purchase was achieved. All insertion aids were removed. Irrigation was performed. Wounds were closed in a layered fashion after confirming good hemostasis. A soft dressing was placed and the patient was allowed to arise from anesthesia. The patient was taken to recovery in stable condition. Overall the patient tolerated the procedure well.     Disposition: Stable to PACU            Electronically signed by Luann Hinds DO on 10/14/2023 at 11:52 AM

## 2023-10-14 NOTE — ACP (ADVANCE CARE PLANNING)
Advance Care Planning     Advance Care Planning Activator (Inpatient)  Conversation Note      Date of ACP Conversation: 10/13/2023     Conversation Conducted with: Patient with Decision Making Capacity    ACP Activator: Nga Alfaro RN        Health Care Decision Maker:     Current Designated Health Care Decision Maker:     Primary Decision Maker: Lawanda Ho - Niece/Nephew - [phone]    Secondary Decision Maker: Derek Tobias Child - [phone]    Care Preferences    Ventilation: \"If you were in your present state of health and suddenly became very ill and were unable to breathe on your own, what would your preference be about the use of a ventilator (breathing machine) if it were available to you? \"      Would the patient desire the use of ventilator (breathing machine)?: yes    \"If your health worsens and it becomes clear that your chance of recovery is unlikely, what would your preference be about the use of a ventilator (breathing machine) if it were available to you? \"     Would the patient desire the use of ventilator (breathing machine)?: Yes      Resuscitation  \"CPR works best to restart the heart when there is a sudden event, like a heart attack, in someone who is otherwise healthy. Unfortunately, CPR does not typically restart the heart for people who have serious health conditions or who are very sick. \"    \"In the event your heart stopped as a result of an underlying serious health condition, would you want attempts to be made to restart your heart (answer \"yes\" for attempt to resuscitate) or would you prefer a natural death (answer \"no\" for do not attempt to resuscitate)? \" yes       [x] Yes   [] No   Educated Patient / Diane Zavala regarding differences between Advance Directives and portable DNR orders.     Length of ACP Conversation in minutes:  10    Conversation Outcomes:  ACP discussion completed    Follow-up plan:    [] Schedule follow-up conversation to continue planning  [] Referred

## 2023-10-14 NOTE — ANESTHESIA PROCEDURE NOTES
Spinal Block    Patient location during procedure: pre-op  End time: 10/14/2023 10:47 AM  Reason for block: primary anesthetic  Staffing  Performed: anesthesiologist   Anesthesiologist: Paulo Herrera DO  Performed by: Paulo Herrera DO  Authorized by: Paulo Herrera DO    Spinal Block  Patient position: right lateral decubitus  Prep: Betadine  Patient monitoring: cardiac monitor, continuous pulse ox and frequent blood pressure checks  Approach: midline  Location: L4/L5  Provider prep: mask and sterile gloves  Local infiltration: lidocaine  Needle  Needle type: Pencan   Needle gauge: 22 G  Needle length: 3.5 in  Assessment  Sensory level: T6  Events: cerebrospinal fluid  Lysis level: CSF aspirated. CSF: clear  Attempts: 1  Hemodynamics: stable  Additional Notes  Free flowing CSF. Negative heme. Negative paresthesia.   .  Preanesthetic Checklist  Completed: patient identified, IV checked, site marked, risks and benefits discussed, surgical/procedural consents, equipment checked, pre-op evaluation, timeout performed, anesthesia consent given, oxygen available and monitors applied/VS acknowledged

## 2023-10-14 NOTE — CONSULTS
CHIEF COMPLAINT: Right hip pain    HISTORY OF PRESENT ILLNESS:      The patient is a 80 y.o. female status post mechanical fall injuring the right hip. She had inability to bear weight. X-rays and CAT scan demonstrated acute impacted subcapital femoral neck fracture on the right. Orthopedic team was contacted for evaluation and treatment.   Past Medical History:        Diagnosis Date    Thyroid disease      PastSurgical History:    Past Surgical History:   Procedure Laterality Date    FRACTURE SURGERY      R ankle          Medications Prior to Admission:    Current Facility-Administered Medications   Medication Dose Route Frequency Provider Last Rate Last Admin    sodium chloride flush 0.9 % injection 5-40 mL  5-40 mL IntraVENous 2 times per day Cullen Valles K, DO        sodium chloride flush 0.9 % injection 5-40 mL  5-40 mL IntraVENous PRN Cullen Valles, DO        0.9 % sodium chloride infusion   IntraVENous PRN Cullen Valles, DO        lidocaine PF 1 % injection 1 mL  1 mL IntraDERmal Once PRN Cullen Valles, DO        ceFAZolin (ANCEF) 2,000 mg in sodium chloride 0.9 % 100 mL IVPB (mini-bag)  2,000 mg IntraVENous Once Chadwick Bennett, DO        sodium chloride flush 0.9 % injection 5-40 mL  5-40 mL IntraVENous 2 times per day XIOMY Bailey   10 mL at 10/13/23 2233    sodium chloride flush 0.9 % injection 5-40 mL  5-40 mL IntraVENous PRN Meenakshi Paulson PA        0.9 % sodium chloride infusion   IntraVENous PRN Meenakshi Paulson PA        ondansetron (ZOFRAN-ODT) disintegrating tablet 4 mg  4 mg Oral Q8H PRN Meenakshi Paulson PA        Or    ondansetron (ZOFRAN) injection 4 mg  4 mg IntraVENous Q8H PRN Meenakshi Paulson PA        polyethylene glycol (GLYCOLAX) packet 17 g  17 g Oral Daily Meenakshi Paulson PA        lactated ringers IV soln infusion   IntraVENous Continuous XIOMY Bailey 75 mL/hr at 10/13/23 2235 New Bag at 10/13/23 2235    acetaminophen None. HISTORY: ORDERING SYSTEM PROVIDED HISTORY: fall TECHNOLOGIST PROVIDED HISTORY: Reason for exam:->fall What reading provider will be dictating this exam?->CRC FINDINGS: There is questionable lucency in the subcapital right femur. Ilioischial and iliopectineal lines are maintained. Question subcapital fracture of the right femur     Assessment: Acute impacted minimally displaced subcapital femoral neck fracture  Plan: Treatment options were discussed. We talked about operative and nonoperative strategies. After lengthy discussion with the patient's son he elected to proceed forth with surgery by way of percutaneous screw fixation. Verbal consent was obtained over the phone and witnessed. Informed consent was placed in the chart.   Scheduled for operative stabilization later today    Loretta St DO  10/14/2023  10:47 AM

## 2023-10-14 NOTE — PLAN OF CARE
Problem: Pain  Goal: Verbalizes/displays adequate comfort level or baseline comfort level  Outcome: Progressing     Problem: Skin/Tissue Integrity  Goal: Absence of new skin breakdown  Description: 1. Monitor for areas of redness and/or skin breakdown  2. Assess vascular access sites hourly  3. Every 4-6 hours minimum:  Change oxygen saturation probe site  4. Every 4-6 hours:  If on nasal continuous positive airway pressure, respiratory therapy assess nares and determine need for appliance change or resting period. Outcome: Progressing     Problem: Safety - Adult  Goal: Free from fall injury  Outcome: Progressing     Problem: ABCDS Injury Assessment  Goal: Absence of physical injury  Outcome: Progressing     Problem: Confusion  Goal: Confusion, delirium, dementia, or psychosis is improved or at baseline  Description: INTERVENTIONS:  1. Assess for possible contributors to thought disturbance, including medications, impaired vision or hearing, underlying metabolic abnormalities, dehydration, psychiatric diagnoses, and notify attending LIP  2. [place] high risk fall precautions, as indicated  3. Provide frequent short contacts to provide reality reorientation, refocusing and direction  4. Decrease environmental stimuli, including noise as appropriate  5. Monitor and intervene to maintain adequate nutrition, hydration, elimination, sleep and activity  6. If unable to ensure safety without constant attention obtain sitter and review sitter guidelines with assigned personnel  7.  Initiate Psychosocial CNS and Spiritual Care consult, as indicated  Outcome: Progressing

## 2023-10-14 NOTE — ANESTHESIA PROCEDURE NOTES
Peripheral Block    Patient location during procedure: pre-op  Reason for block: post-op pain management and at surgeon's request  Start time: 10/14/2023 10:37 AM  End time: 10/14/2023 10:47 AM  Staffing  Performed: anesthesiologist   Anesthesiologist: Michael Steven DO  Performed by: Michael Steven DO  Authorized by: Michael Steven DO    Preanesthetic Checklist  Completed: patient identified, IV checked, site marked, risks and benefits discussed, surgical/procedural consents, equipment checked, pre-op evaluation, timeout performed, anesthesia consent given, oxygen available and monitors applied/VS acknowledged  Peripheral Block   Patient position: supine  Prep: ChloraPrep  Provider prep: mask and sterile gloves (Sterile probe cover)  Patient monitoring: cardiac monitor, continuous pulse ox, frequent blood pressure checks and IV access  Block type: PENG  Laterality: right  Injection technique: single-shot  Guidance: ultrasound guided  Local infiltration: ropivacaine  Infiltration strength: 0.5 %  Local infiltration: ropivacaine  Dose: 20 mL    Needle   Needle type: combined needle/nerve stimulator   Needle gauge: 22 G  Needle localization: anatomical landmarks and ultrasound guidance  Needle length: 10 cm  Assessment   Injection assessment: negative aspiration for heme, no paresthesia on injection and local visualized surrounding nerve on ultrasound  Paresthesia pain: immediately resolved  Slow fractionated injection: yes  Hemodynamics: stable  Real-time US image taken/store: yes    Additional Notes  Ultrasound image printed and saved in patient chart.     Sterile probe cover used    Medications Administered  bupivacaine (MARCAINE) PF injection 0.5% - Perineural   20 mL - 10/14/2023 10:37:00 AM

## 2023-10-14 NOTE — PROGRESS NOTES
Pt is confused, trying to get up. Video monitoring was initiated for pt's safety. Pt is not listening to the , still tries to get up, pulled her IV out, has been redirected multiple times. Pt is now with a bedside sitter for safety. Pt passed the bedside swallow screening, pt is very hard of hearing but can read lips. Denies pain. New IV was started. Her hearing aids are on the table but she refused them at the time of assessment.

## 2023-10-14 NOTE — ANESTHESIA POSTPROCEDURE EVALUATION
Department of Anesthesiology  Postprocedure Note    Patient: Jocelyne Roque  MRN: [id number]  YOB: 1929  Date of evaluation: 10/14/2023      Procedure Summary     Date: 10/14/23 Room / Location: [address]    Anesthesia Start: 1037 Anesthesia Stop:     Procedure: PERCUTANEOUS SCREW FIXATION RIGHT FEMORAL NECK (Right: Hip) Diagnosis:       Closed fracture of left hip, initial encounter ([address])      (Closed fracture of left hip, initial encounter ([address]) [S72.002A])    Surgeons: Laura Mackenzie DO Responsible Provider: Daina Anderson DO    Anesthesia Type: spinal, regional ASA Status: 3 - Emergent          Anesthesia Type: No value filed.     Daniel Phase I:      Daniel Phase II:        Anesthesia Post Evaluation    Patient location during evaluation: PACU  Patient participation: complete - patient participated  Level of consciousness: awake  Pain score: 0  Airway patency: patent  Nausea & Vomiting: no nausea and no vomiting  Complications: no  Cardiovascular status: hemodynamically stable  Respiratory status: acceptable  Hydration status: stable  Pain management: adequate

## 2023-10-14 NOTE — ANESTHESIA PRE PROCEDURE
Lab Results   Component Value Date/Time     10/14/2023 07:32 AM    K 3.9 10/14/2023 07:32 AM     10/14/2023 07:32 AM    CO2 25 10/14/2023 07:32 AM    BUN 21 10/14/2023 07:32 AM    CREATININE 0.88 10/14/2023 07:32 AM    GFRAA >60.0 09/21/2022 03:12 PM    LABGLOM >60.0 10/14/2023 07:32 AM    GLUCOSE 102 10/14/2023 07:32 AM    GLUCOSE 87 11/08/2011 08:41 AM    PROT 7.2 10/13/2023 02:00 PM    CALCIUM 8.6 10/14/2023 07:32 AM    BILITOT 1.2 10/13/2023 02:00 PM    ALKPHOS 68 10/13/2023 02:00 PM    AST 23 10/13/2023 02:00 PM    ALT 13 10/13/2023 02:00 PM       POC Tests: No results for input(s): \"POCGLU\", \"POCNA\", \"POCK\", \"POCCL\", \"POCBUN\", \"POCHEMO\", \"POCHCT\" in the last 72 hours.     Coags:   Lab Results   Component Value Date/Time    PROTIME 15.3 10/14/2023 07:32 AM    INR 1.2 10/14/2023 07:32 AM    APTT 34.4 10/14/2023 07:32 AM       HCG (If Applicable): No results found for: \"PREGTESTUR\", \"PREGSERUM\", \"HCG\", \"HCGQUANT\"     ABGs: No results found for: \"PHART\", \"PO2ART\", \"XQF7UAH\", \"PML4GOY\", \"BEART\", \"Y6VQLHSW\"     Type & Screen (If Applicable):  No results found for: \"LABABO\", \"LABRH\"    Drug/Infectious Status (If Applicable):  No results found for: \"HIV\", \"HEPCAB\"    COVID-19 Screening (If Applicable): No results found for: \"COVID19\"        Anesthesia Evaluation  Patient summary reviewed and Nursing notes reviewed no history of anesthetic complications:   Airway: Mallampati: II  TM distance: >3 FB   Neck ROM: full  Mouth opening: > = 3 FB   Dental: normal exam         Pulmonary:Negative Pulmonary ROS and normal exam                               Cardiovascular:Negative CV ROS  Exercise tolerance: good (>4 METS),   (+) hyperlipidemia      ECG reviewed               Beta Blocker:  Not on Beta Blocker      ROS comment: Normal sinus rhythm  T wave abnormality, consider anterolateral ischemia  Abnormal ECG  No previous ECGs available     Neuro/Psych:   (+) dementia            GI/Hepatic/Renal:   (+) renal

## 2023-10-15 LAB
ANION GAP SERPL CALCULATED.3IONS-SCNC: 13 MEQ/L (ref 9–15)
BUN SERPL-MCNC: 16 MG/DL (ref 8–23)
CALCIUM SERPL-MCNC: 9.1 MG/DL (ref 8.5–9.9)
CHLORIDE SERPL-SCNC: 104 MEQ/L (ref 95–107)
CO2 SERPL-SCNC: 25 MEQ/L (ref 20–31)
CREAT SERPL-MCNC: 0.86 MG/DL (ref 0.5–0.9)
ERYTHROCYTE [DISTWIDTH] IN BLOOD BY AUTOMATED COUNT: 12 % (ref 11.5–14.5)
GLUCOSE SERPL-MCNC: 121 MG/DL (ref 70–99)
HCT VFR BLD AUTO: 40.9 % (ref 37–47)
HGB BLD-MCNC: 13.6 G/DL (ref 12–16)
MCH RBC QN AUTO: 29.2 PG (ref 27–31.3)
MCHC RBC AUTO-ENTMCNC: 33.3 % (ref 33–37)
MCV RBC AUTO: 88 FL (ref 79.4–94.8)
PLATELET # BLD AUTO: 209 K/UL (ref 130–400)
POTASSIUM SERPL-SCNC: 3.8 MEQ/L (ref 3.4–4.9)
RBC # BLD AUTO: 4.65 M/UL (ref 4.2–5.4)
SODIUM SERPL-SCNC: 142 MEQ/L (ref 135–144)
WBC # BLD AUTO: 10.6 K/UL (ref 4.8–10.8)

## 2023-10-15 PROCEDURE — 6360000002 HC RX W HCPCS: Performed by: ORTHOPAEDIC SURGERY

## 2023-10-15 PROCEDURE — 97166 OT EVAL MOD COMPLEX 45 MIN: CPT

## 2023-10-15 PROCEDURE — 6370000000 HC RX 637 (ALT 250 FOR IP): Performed by: PHYSICIAN ASSISTANT

## 2023-10-15 PROCEDURE — 1210000000 HC MED SURG R&B

## 2023-10-15 PROCEDURE — 2700000000 HC OXYGEN THERAPY PER DAY

## 2023-10-15 PROCEDURE — 97162 PT EVAL MOD COMPLEX 30 MIN: CPT

## 2023-10-15 PROCEDURE — 6360000002 HC RX W HCPCS

## 2023-10-15 PROCEDURE — 85027 COMPLETE CBC AUTOMATED: CPT

## 2023-10-15 PROCEDURE — 2580000003 HC RX 258: Performed by: PHYSICIAN ASSISTANT

## 2023-10-15 PROCEDURE — 80048 BASIC METABOLIC PNL TOTAL CA: CPT

## 2023-10-15 PROCEDURE — 6370000000 HC RX 637 (ALT 250 FOR IP)

## 2023-10-15 PROCEDURE — 36415 COLL VENOUS BLD VENIPUNCTURE: CPT

## 2023-10-15 PROCEDURE — 2580000003 HC RX 258: Performed by: ORTHOPAEDIC SURGERY

## 2023-10-15 RX ORDER — HYDROXYZINE HYDROCHLORIDE 10 MG/1
10 TABLET, FILM COATED ORAL 2 TIMES DAILY PRN
Status: DISCONTINUED | OUTPATIENT
Start: 2023-10-15 | End: 2023-10-20 | Stop reason: HOSPADM

## 2023-10-15 RX ORDER — HEPARIN SODIUM 5000 [USP'U]/ML
5000 INJECTION, SOLUTION INTRAVENOUS; SUBCUTANEOUS EVERY 8 HOURS SCHEDULED
Status: DISCONTINUED | OUTPATIENT
Start: 2023-10-15 | End: 2023-10-20 | Stop reason: HOSPADM

## 2023-10-15 RX ORDER — MECOBALAMIN 5000 MCG
5 TABLET,DISINTEGRATING ORAL NIGHTLY PRN
Status: DISCONTINUED | OUTPATIENT
Start: 2023-10-15 | End: 2023-10-20 | Stop reason: HOSPADM

## 2023-10-15 RX ADMIN — ACETAMINOPHEN 650 MG: 325 TABLET ORAL at 11:28

## 2023-10-15 RX ADMIN — POLYETHYLENE GLYCOL 3350 17 G: 17 POWDER, FOR SOLUTION ORAL at 08:33

## 2023-10-15 RX ADMIN — SENNOSIDES, DOCUSATE SODIUM 1 TABLET: 8.6; 5 TABLET ORAL at 08:31

## 2023-10-15 RX ADMIN — OXYCODONE HYDROCHLORIDE 5 MG: 5 TABLET ORAL at 23:55

## 2023-10-15 RX ADMIN — OXYCODONE HYDROCHLORIDE 2.5 MG: 5 TABLET ORAL at 11:28

## 2023-10-15 RX ADMIN — LEVOTHYROXINE SODIUM 75 MCG: 0.07 TABLET ORAL at 06:16

## 2023-10-15 RX ADMIN — ACETAMINOPHEN 650 MG: 325 TABLET ORAL at 06:16

## 2023-10-15 RX ADMIN — Medication 10 ML: at 08:34

## 2023-10-15 RX ADMIN — Medication 5 MG: at 22:14

## 2023-10-15 RX ADMIN — HYDROXYZINE HYDROCHLORIDE 10 MG: 10 TABLET ORAL at 20:07

## 2023-10-15 RX ADMIN — OXYCODONE HYDROCHLORIDE 5 MG: 5 TABLET ORAL at 02:46

## 2023-10-15 RX ADMIN — HEPARIN SODIUM 5000 UNITS: 5000 INJECTION INTRAVENOUS; SUBCUTANEOUS at 20:07

## 2023-10-15 RX ADMIN — ACETAMINOPHEN 650 MG: 325 TABLET ORAL at 22:14

## 2023-10-15 RX ADMIN — ACETAMINOPHEN 650 MG: 325 TABLET ORAL at 17:41

## 2023-10-15 RX ADMIN — SENNOSIDES, DOCUSATE SODIUM 1 TABLET: 8.6; 5 TABLET ORAL at 20:07

## 2023-10-15 RX ADMIN — HYDROXYZINE HYDROCHLORIDE 10 MG: 10 TABLET ORAL at 08:31

## 2023-10-15 RX ADMIN — HEPARIN SODIUM 5000 UNITS: 5000 INJECTION INTRAVENOUS; SUBCUTANEOUS at 15:17

## 2023-10-15 RX ADMIN — HEPARIN SODIUM 5000 UNITS: 5000 INJECTION INTRAVENOUS; SUBCUTANEOUS at 08:33

## 2023-10-15 RX ADMIN — CEFAZOLIN 2000 MG: 2 INJECTION, POWDER, FOR SOLUTION INTRAMUSCULAR; INTRAVENOUS at 02:46

## 2023-10-15 RX ADMIN — Medication 10 ML: at 20:13

## 2023-10-15 ASSESSMENT — PAIN DESCRIPTION - DESCRIPTORS
DESCRIPTORS: SORE
DESCRIPTORS: ACHING
DESCRIPTORS: ACHING

## 2023-10-15 ASSESSMENT — PAIN SCALES - GENERAL
PAINLEVEL_OUTOF10: 6
PAINLEVEL_OUTOF10: 4
PAINLEVEL_OUTOF10: 5
PAINLEVEL_OUTOF10: 6

## 2023-10-15 ASSESSMENT — PAIN DESCRIPTION - ORIENTATION
ORIENTATION: RIGHT

## 2023-10-15 ASSESSMENT — PAIN DESCRIPTION - LOCATION
LOCATION: HIP

## 2023-10-15 NOTE — PROGRESS NOTES
[facility] OCCUPATIONAL THERAPY EVALUATION - ACUTE     NAME: Michael Oleary  : 11/3/1929 (80 y.o.)  MRN: [id number]  CODE STATUS: Full Code  Room: S886S673-45    Date of Service: 10/15/2023    Patient Diagnosis(es): T wave inversion in EKG [R94.31]  Closed fracture of right hip, initial encounter [facility]) [S72.001A]  Closed subcapital fracture of femur, right, initial encounter [facility]) [S72.011A]   Patient Active Problem List    Diagnosis Date Noted    Closed subcapital fracture of femur, right, initial encounter ([address]) 10/13/2023        Past Medical History:   Diagnosis Date    Thyroid disease      Past Surgical History:   Procedure Laterality Date    FRACTURE SURGERY      R ankle         Restrictions  Restrictions/Precautions: Weight Bearing, Fall Risk      Right Lower Extremity Weight Bearing: Toe Touch Weight Bearing       Safety Devices: Safety Devices  Type of Devices: All fall risk precautions in place;Call light within reach; Sitter present; Left in chair     Patient's date of birth confirmed: Yes    General:  Patient assessed for rehabilitation services?: Yes    Subjective:Pt seen with son present. \"Thank you for coming to visit. It was nice to meet you. \"          Pain at start of treatment: No    Pain at end of treatment: No    Location: N/A  Description: N/A  Nursing notified: No  RN: N/A  Intervention: Repositioned    Prior Level of Function:  Social/Functional History  Lives With: Alone  Type of Home: House  Home Layout: Two level, Bed/Bath upstairs, Laundry in basement, 1/2 bath on main level (h/o slow on steps)  Home Access: Stairs to enter without rails  Entrance Stairs - Number of Steps: 1+1  Bathroom Shower/Tub: Walk-in shower  Bathroom Toilet: Standard  Bathroom Equipment: Grab bars in shower  Bathroom Accessibility: [address] [place] [address] Street: [facility]  Has the patient had two or more falls in the past year or any fall with injury in the past year?: No  Receives Help From: Family  ADL Assistance:

## 2023-10-15 NOTE — PROGRESS NOTES
Patient resting comfortably status post percutaneous good fixation right femoral neck fracture. Pain adequately controlled. Dressing with minimal strikethrough. Intact to sensory and motor distally. Calf supple and nontender. Assessment: Postoperative day #1 status post percutaneous good fixation right femoral neck fracture        Continue current orthopedic care  Toe-touch weightbearing to right lower extremity  Orthopedic team signing off.   Please call with any questions or concerns  DVT/PE prophylaxis management per primary team; 4 weeks anticoagulation per hip fracture protocol  Follow-up with me in 10 to 14 days from surgery

## 2023-10-15 NOTE — PROGRESS NOTES
Physical Therapy Missed Treatment   Facility/Department: [facility] MED SURG [id number]    NAME: Marco Antonio Collazo    : 11/3/1929 (80 y.o.)  MRN: [id number]    Account: [de-identified]  Gender: female      PT referral received. Chart reviewed. RN hold at this time due to pt did not sleep last night and has increased confusion. Will follow and attempt PT evaluation again at earliest availability.        Jenniffer Mccall, PT, 10/15/23 at 11:41 AM

## 2023-10-15 NOTE — PROGRESS NOTES
Vision  Vision: Impaired  Vision Exceptions: Wears glasses at all times  Hearing: Exceptions to [facility]  Hearing Exceptions: Hard of hearing/hearing concerns;Bilateral hearing aid    Cognition:  Overall Orientation Status: Impaired  Orientation Level: Oriented to person, Disoriented to time, Disoriented to situation, Disoriented to place  Cognition Comment: follows one step commands with repetition secondary to decreased cognition and [place]    Observation/Palpation  Posture: Fair  Observation: pt pleasant and agreeable to PT eval    ROM:  RLE AROM: WFL  LLE AROM : WFL    Strength:  Strength RLE  Comment: grossly 3-/5  Strength LLE  Comment: grossly 4-/5    Neuro:  Balance  Sitting - Static: Good  Sitting - Dynamic: Good;-  Standing - Static: Fair;-  Standing - Dynamic: Fair;-    Sensation: Intact    Bed mobility  Supine to Sit: Stand by assistance  Sit to Supine:  (NT- due to pt left in BSC to encourage OOB activity)  Bed Mobility Comments: HOB elevated    Transfers  Sit to Stand: Minimal Assistance;2 Person Assistance  Stand to Sit: Minimal Assistance;2 Person Assistance  Bed to Chair: Minimal assistance;2 Person Assistance  Comment: max verbal, tactile, visual cues to complete NWBing R LE; pt able to demonstrate new weight bearing status intermittently during mobility; pt unable to comprehend TTWBing due to poor cognition therefore treated as NWBing R LE    Ambulation  Comments: unsafe to assess due to pt unable to demonstrate weight bearing status R LE during standing and SPT    Activity Tolerance  Activity Tolerance: Treatment limited secondary to decreased cognition    Patient Education  Education Given To: Patient  Education Provided: Role of Therapy;Plan of Care  Education Method: Verbal;Demonstration  Education Outcome: Verbalized understanding;Continued education needed     ASSESSMENT:   Body Structures, Functions, Activity Limitations Requiring Skilled Therapeutic Intervention: Decreased functional mobility 1253   Time Out 1311   Minutes 18       Eval X 18 min    Marily Wright, PT, 10/15/23 at 1:43 PM         Definitions for assistance levels  Independent = pt does not require any physical supervision or assistance from another person for activity completion. Device may be needed.   Stand by assistance = pt requires verbal cues or instructions from another person, close to but not touching, to perform the activity  Minimal assistance= pt performs 75% or more of the activity; assistance is required to complete the activity  Moderate assistance= pt performs 50% of the activity; assistance is required to complete the activity  Maximal assistance = pt performs 25% of the activity; assistance is required to complete the activity  Dependent = pt requires total physical assistance to accomplish the task

## 2023-10-15 NOTE — PLAN OF CARE
Problem: Pain  Goal: Verbalizes/displays adequate comfort level or baseline comfort level  Outcome: Progressing  Flowsheets (Taken 10/15/2023 5656)  Verbalizes/displays adequate comfort level or baseline comfort level:   Assess pain using appropriate pain scale   Encourage patient to monitor pain and request assistance   Administer analgesics based on type and severity of pain and evaluate response   Implement non-pharmacological measures as appropriate and evaluate response   Consider cultural and social influences on pain and pain management   Notify Licensed Independent Practitioner if interventions unsuccessful or patient reports new pain     Problem: Skin/Tissue Integrity  Goal: Absence of new skin breakdown  Description: 1. Monitor for areas of redness and/or skin breakdown  2. Assess vascular access sites hourly  3. Every 4-6 hours minimum:  Change oxygen saturation probe site  4. Every 4-6 hours:  If on nasal continuous positive airway pressure, respiratory therapy assess nares and determine need for appliance change or resting period. Outcome: Progressing     Problem: Safety - Adult  Goal: Free from fall injury  Outcome: Progressing  Flowsheets (Taken 10/15/2023 1507)  Free From Fall Injury:   Instruct family/caregiver on patient safety   Based on caregiver fall risk screen, instruct family/caregiver to ask for assistance with transferring infant if caregiver noted to have fall risk factors     Problem: ABCDS Injury Assessment  Goal: Absence of physical injury  Outcome: Progressing  Flowsheets (Taken 10/15/2023 1507)  Absence of Physical Injury: Implement safety measures based on patient assessment     Problem: Confusion  Goal: Confusion, delirium, dementia, or psychosis is improved or at baseline  Description: INTERVENTIONS:  1.  Assess for possible contributors to thought disturbance, including medications, impaired vision or hearing, underlying metabolic abnormalities, dehydration, psychiatric if patient needs post-hospital services based on physician order or complex needs related to functional status, cognitive ability or social support system

## 2023-10-16 ENCOUNTER — APPOINTMENT (OUTPATIENT)
Dept: ULTRASOUND IMAGING | Age: 88
DRG: 481 | End: 2023-10-16
Payer: MEDICARE

## 2023-10-16 LAB
ANION GAP SERPL CALCULATED.3IONS-SCNC: 10 MEQ/L (ref 9–15)
BUN SERPL-MCNC: 15 MG/DL (ref 8–23)
CALCIUM SERPL-MCNC: 8.8 MG/DL (ref 8.5–9.9)
CHLORIDE SERPL-SCNC: 101 MEQ/L (ref 95–107)
CO2 SERPL-SCNC: 28 MEQ/L (ref 20–31)
CREAT SERPL-MCNC: 0.82 MG/DL (ref 0.5–0.9)
ERYTHROCYTE [DISTWIDTH] IN BLOOD BY AUTOMATED COUNT: 12.5 % (ref 11.5–14.5)
GLUCOSE SERPL-MCNC: 100 MG/DL (ref 70–99)
HCT VFR BLD AUTO: 37.2 % (ref 37–47)
HGB BLD-MCNC: 12.3 G/DL (ref 12–16)
MAGNESIUM SERPL-MCNC: 1.9 MG/DL (ref 1.7–2.4)
MCH RBC QN AUTO: 29.1 PG (ref 27–31.3)
MCHC RBC AUTO-ENTMCNC: 33.1 % (ref 33–37)
MCV RBC AUTO: 88.2 FL (ref 79.4–94.8)
PLATELET # BLD AUTO: 201 K/UL (ref 130–400)
POTASSIUM SERPL-SCNC: 3.4 MEQ/L (ref 3.4–4.9)
RBC # BLD AUTO: 4.22 M/UL (ref 4.2–5.4)
SODIUM SERPL-SCNC: 139 MEQ/L (ref 135–144)
WBC # BLD AUTO: 9.2 K/UL (ref 4.8–10.8)

## 2023-10-16 PROCEDURE — 97535 SELF CARE MNGMENT TRAINING: CPT

## 2023-10-16 PROCEDURE — 1210000000 HC MED SURG R&B

## 2023-10-16 PROCEDURE — 6370000000 HC RX 637 (ALT 250 FOR IP): Performed by: PHYSICIAN ASSISTANT

## 2023-10-16 PROCEDURE — 6370000000 HC RX 637 (ALT 250 FOR IP)

## 2023-10-16 PROCEDURE — 6360000002 HC RX W HCPCS

## 2023-10-16 PROCEDURE — 99232 SBSQ HOSP IP/OBS MODERATE 35: CPT | Performed by: SURGERY

## 2023-10-16 PROCEDURE — 83735 ASSAY OF MAGNESIUM: CPT

## 2023-10-16 PROCEDURE — 36415 COLL VENOUS BLD VENIPUNCTURE: CPT

## 2023-10-16 PROCEDURE — 80048 BASIC METABOLIC PNL TOTAL CA: CPT

## 2023-10-16 PROCEDURE — 93971 EXTREMITY STUDY: CPT

## 2023-10-16 PROCEDURE — 6370000000 HC RX 637 (ALT 250 FOR IP): Performed by: SURGERY

## 2023-10-16 PROCEDURE — 85027 COMPLETE CBC AUTOMATED: CPT

## 2023-10-16 RX ORDER — ACETAMINOPHEN 80 MG
TABLET,CHEWABLE ORAL ONCE
Status: DISCONTINUED | OUTPATIENT
Start: 2023-10-16 | End: 2023-10-20 | Stop reason: HOSPADM

## 2023-10-16 RX ORDER — QUETIAPINE FUMARATE 25 MG/1
12.5 TABLET, FILM COATED ORAL NIGHTLY
Status: DISCONTINUED | OUTPATIENT
Start: 2023-10-16 | End: 2023-10-20 | Stop reason: HOSPADM

## 2023-10-16 RX ORDER — POTASSIUM CHLORIDE 20 MEQ/1
40 TABLET, EXTENDED RELEASE ORAL ONCE
Status: DISCONTINUED | OUTPATIENT
Start: 2023-10-16 | End: 2023-10-16

## 2023-10-16 RX ADMIN — HEPARIN SODIUM 5000 UNITS: 5000 INJECTION INTRAVENOUS; SUBCUTANEOUS at 13:23

## 2023-10-16 RX ADMIN — ACETAMINOPHEN 650 MG: 325 TABLET ORAL at 16:33

## 2023-10-16 RX ADMIN — ACETAMINOPHEN 650 MG: 325 TABLET ORAL at 11:31

## 2023-10-16 RX ADMIN — HEPARIN SODIUM 5000 UNITS: 5000 INJECTION INTRAVENOUS; SUBCUTANEOUS at 06:50

## 2023-10-16 RX ADMIN — ACETAMINOPHEN 650 MG: 325 TABLET ORAL at 06:50

## 2023-10-16 RX ADMIN — POTASSIUM BICARBONATE 40 MEQ: 782 TABLET, EFFERVESCENT ORAL at 16:33

## 2023-10-16 RX ADMIN — HEPARIN SODIUM 5000 UNITS: 5000 INJECTION INTRAVENOUS; SUBCUTANEOUS at 21:35

## 2023-10-16 RX ADMIN — QUETIAPINE FUMARATE 12.5 MG: 25 TABLET ORAL at 21:35

## 2023-10-16 RX ADMIN — SENNOSIDES, DOCUSATE SODIUM 1 TABLET: 8.6; 5 TABLET ORAL at 21:35

## 2023-10-16 RX ADMIN — LEVOTHYROXINE SODIUM 75 MCG: 0.07 TABLET ORAL at 06:51

## 2023-10-16 ASSESSMENT — PAIN DESCRIPTION - DESCRIPTORS
DESCRIPTORS: ACHING
DESCRIPTORS: SORE

## 2023-10-16 ASSESSMENT — PAIN SCALES - GENERAL
PAINLEVEL_OUTOF10: 0
PAINLEVEL_OUTOF10: 5
PAINLEVEL_OUTOF10: 3

## 2023-10-16 ASSESSMENT — PAIN DESCRIPTION - ORIENTATION
ORIENTATION: RIGHT
ORIENTATION: RIGHT

## 2023-10-16 ASSESSMENT — PAIN DESCRIPTION - LOCATION
LOCATION: HIP
LOCATION: HIP

## 2023-10-16 NOTE — PROGRESS NOTES
Physical Therapy Med Surg Daily Treatment Note  Facility/Department: [facility]  Room: Y916/T471-00       NAME: Henri Carmichael  : 11/3/1929 (80 y.o.)  MRN: [id number]  CODE STATUS: Full Code    Date of Service: 10/16/2023    Patient Diagnosis(es): T wave inversion in EKG [R94.31]  Closed fracture of right hip, initial encounter [facility]) [S72.001A]  Closed subcapital fracture of femur, right, initial encounter [facility]) [S72.011A]   Chief Complaint   Patient presents with    Fall     Rt hip pain      Patient Active Problem List    Diagnosis Date Noted    Closed subcapital fracture of femur, right, initial encounter ([address]) 10/13/2023        Past Medical History:   Diagnosis Date    Thyroid disease      Past Surgical History:   Procedure Laterality Date    FRACTURE SURGERY      R ankle     HIP SURGERY Right 10/14/2023    PERCUTANEOUS SCREW FIXATION RIGHT FEMORAL NECK performed by Shawn Rodarte DO at [address]            Restrictions:tdwb right        SUBJECTIVE:   Subjective: \"i really enjoyed this dinner thanks so much\" pt pleasantly confused    Pain  Pain: no signs of pain. OBJECTIVE:   Orientation  Overall Orientation Status: Impaired  Orientation Level: Oriented to person  Cognition  Overall Cognitive Status: Exceptions  Following Commands: Follows one step commands with increased time  Attention Span: Attends with cues to redirect  Insights: Decreased awareness of deficits  Initiation: Requires cues for some    Bed Mobility Training  Bed Mobility Training: Yes  Overall Level of Assistance: Stand-by assistance  Interventions: Safety awareness training  Rolling: Stand-by assistance  Supine to Sit: Stand-by assistance  Scooting: Stand-by assistance    Transfer Training  Transfer Training: Yes  Overall Level of Assistance: Minimum assistance  Interventions: Safety awareness training; Tactile cues  Sit to Stand: Minimum assistance  Stand to Sit: Minimum assistance

## 2023-10-16 NOTE — PLAN OF CARE
Problem: Pain  Goal: Verbalizes/displays adequate comfort level or baseline comfort level  10/16/2023 1442 by Chio Cervantes RN  Outcome: Progressing  10/16/2023 0105 by Bess Pop RN  Outcome: Progressing     Problem: Skin/Tissue Integrity  Goal: Absence of new skin breakdown  Description: 1. Monitor for areas of redness and/or skin breakdown  2. Assess vascular access sites hourly  3. Every 4-6 hours minimum:  Change oxygen saturation probe site  4. Every 4-6 hours:  If on nasal continuous positive airway pressure, respiratory therapy assess nares and determine need for appliance change or resting period. 10/16/2023 1442 by Chio Cervantes RN  Outcome: Progressing  10/16/2023 0105 by Bess Pop RN  Outcome: Progressing     Problem: Safety - Adult  Goal: Free from fall injury  10/16/2023 1442 by Chio Cervantes RN  Outcome: Progressing  10/16/2023 0105 by Bess Pop RN  Outcome: Progressing     Problem: ABCDS Injury Assessment  Goal: Absence of physical injury  10/16/2023 1442 by Chio Cervantes RN  Outcome: Progressing  10/16/2023 0105 by Bess Pop RN  Outcome: Progressing     Problem: Confusion  Goal: Confusion, delirium, dementia, or psychosis is improved or at baseline  Description: INTERVENTIONS:  1. Assess for possible contributors to thought disturbance, including medications, impaired vision or hearing, underlying metabolic abnormalities, dehydration, psychiatric diagnoses, and notify attending LIP  2. [place] high risk fall precautions, as indicated  3. Provide frequent short contacts to provide reality reorientation, refocusing and direction  4. Decrease environmental stimuli, including noise as appropriate  5. Monitor and intervene to maintain adequate nutrition, hydration, elimination, sleep and activity  6. If unable to ensure safety without constant attention obtain sitter and review sitter guidelines with assigned personnel  7.  Initiate Psychosocial CNS and

## 2023-10-16 NOTE — PLAN OF CARE
Problem: Confusion  Goal: Confusion, delirium, dementia, or psychosis is improved or at baseline  Description: INTERVENTIONS:  1. Assess for possible contributors to thought disturbance, including medications, impaired vision or hearing, underlying metabolic abnormalities, dehydration, psychiatric diagnoses, and notify attending LIP  2. [place] high risk fall precautions, as indicated  3. Provide frequent short contacts to provide reality reorientation, refocusing and direction  4. Decrease environmental stimuli, including noise as appropriate  5. Monitor and intervene to maintain adequate nutrition, hydration, elimination, sleep and activity  6. If unable to ensure safety without constant attention obtain sitter and review sitter guidelines with assigned personnel  7.  Initiate Psychosocial CNS and Spiritual Care consult, as indicated  10/16/2023 0105 by Bess Pop RN  Outcome: Not Progressing  Flowsheets (Taken 10/15/2023 2200)  Effect of thought disturbance (confusion, delirium, dementia, or psychosis) are managed with adequate functional status:   Assess for contributors to thought disturbance, including medications, impaired vision or hearing, underlying metabolic abnormalities, dehydration, psychiatric diagnoses, notify [facility] high risk fall precautions, as indicated   Provide frequent short contacts to provide reality reorientation, refocusing and direction   Decrease environmental stimuli, including noise as appropriate   Monitor and intervene to maintain adequate nutrition, hydration, elimination, sleep and activity   If unable to ensure safety without constant attention obtain sitter and review sitter guidelines with assigned personnel  10/15/2023 1508 by Aubrey Valentin RN  Outcome: Progressing  Flowsheets (Taken 10/15/2023 0800)  Effect of thought disturbance (confusion, delirium, dementia, or psychosis) are managed with adequate functional status:   Assess for contributors to thought disturbance, including medications, impaired vision or hearing, underlying metabolic abnormalities, dehydration, psychiatric diagnoses, notify [address] high risk fall precautions, as indicated   Provide frequent short contacts to provide reality reorientation, refocusing and direction   Monitor and intervene to maintain adequate nutrition, hydration, elimination, sleep and activity   Decrease environmental stimuli, including noise as appropriate   If unable to ensure safety without constant attention obtain sitter and review sitter guidelines with assigned personnel   Initiate Psychosocial Clinical Nurse Specialist and [address] [place] [address] consult, as indicated     Problem: Confusion  Goal: Confusion, delirium, dementia, or psychosis is improved or at baseline  Description: INTERVENTIONS:  1. Assess for possible contributors to thought disturbance, including medications, impaired vision or hearing, underlying metabolic abnormalities, dehydration, psychiatric diagnoses, and notify attending LIP  2. [place] high risk fall precautions, as indicated  3. Provide frequent short contacts to provide reality reorientation, refocusing and direction  4. Decrease environmental stimuli, including noise as appropriate  5. Monitor and intervene to maintain adequate nutrition, hydration, elimination, sleep and activity  6. If unable to ensure safety without constant attention obtain sitter and review sitter guidelines with assigned personnel  7.  Initiate Psychosocial CNS and Spiritual Care consult, as indicated  10/16/2023 0105 by Warner Callahan, RN  Outcome: Not Progressing  Flowsheets (Taken 10/15/2023 2200)  Effect of thought disturbance (confusion, delirium, dementia, or psychosis) are managed with adequate functional status:   Assess for contributors to thought disturbance, including medications, impaired vision or hearing, underlying metabolic abnormalities, dehydration, psychiatric diagnoses, notify [address] [facility]

## 2023-10-17 PROCEDURE — 97535 SELF CARE MNGMENT TRAINING: CPT

## 2023-10-17 PROCEDURE — 6370000000 HC RX 637 (ALT 250 FOR IP)

## 2023-10-17 PROCEDURE — 6370000000 HC RX 637 (ALT 250 FOR IP): Performed by: SURGERY

## 2023-10-17 PROCEDURE — 6370000000 HC RX 637 (ALT 250 FOR IP): Performed by: PHYSICIAN ASSISTANT

## 2023-10-17 PROCEDURE — 1210000000 HC MED SURG R&B

## 2023-10-17 PROCEDURE — 6360000002 HC RX W HCPCS

## 2023-10-17 RX ADMIN — HEPARIN SODIUM 5000 UNITS: 5000 INJECTION INTRAVENOUS; SUBCUTANEOUS at 21:35

## 2023-10-17 RX ADMIN — LEVOTHYROXINE SODIUM 75 MCG: 0.07 TABLET ORAL at 06:41

## 2023-10-17 RX ADMIN — HYDROXYZINE HYDROCHLORIDE 10 MG: 10 TABLET ORAL at 00:13

## 2023-10-17 RX ADMIN — SENNOSIDES, DOCUSATE SODIUM 1 TABLET: 8.6; 5 TABLET ORAL at 21:35

## 2023-10-17 RX ADMIN — HEPARIN SODIUM 5000 UNITS: 5000 INJECTION INTRAVENOUS; SUBCUTANEOUS at 06:41

## 2023-10-17 RX ADMIN — ACETAMINOPHEN 650 MG: 325 TABLET ORAL at 06:42

## 2023-10-17 RX ADMIN — QUETIAPINE FUMARATE 12.5 MG: 25 TABLET ORAL at 21:35

## 2023-10-17 RX ADMIN — OXYCODONE HYDROCHLORIDE 5 MG: 5 TABLET ORAL at 00:13

## 2023-10-17 RX ADMIN — ACETAMINOPHEN 650 MG: 325 TABLET ORAL at 18:37

## 2023-10-17 RX ADMIN — HYDROXYZINE HYDROCHLORIDE 10 MG: 10 TABLET ORAL at 18:38

## 2023-10-17 RX ADMIN — HEPARIN SODIUM 5000 UNITS: 5000 INJECTION INTRAVENOUS; SUBCUTANEOUS at 18:38

## 2023-10-17 ASSESSMENT — PAIN DESCRIPTION - LOCATION: LOCATION: HIP

## 2023-10-17 ASSESSMENT — PAIN SCALES - GENERAL
PAINLEVEL_OUTOF10: 7
PAINLEVEL_OUTOF10: 1

## 2023-10-17 ASSESSMENT — PAIN DESCRIPTION - ORIENTATION: ORIENTATION: RIGHT

## 2023-10-17 NOTE — PROGRESS NOTES
Physical Therapy Med Surg Daily Treatment Note  Facility/Department: [facility]  Room: K260/I368-36       NAME: Darien Guidry  : 11/3/1929 (80 y.o.)  MRN: [id number]  CODE STATUS: Full Code    Date of Service: 10/17/2023    Patient Diagnosis(es): T wave inversion in EKG [R94.31]  Closed fracture of right hip, initial encounter [facility]) [S72.001A]  Closed subcapital fracture of femur, right, initial encounter [facility]) [S72.011A]   Chief Complaint   Patient presents with    Fall     Rt hip pain      Patient Active Problem List    Diagnosis Date Noted    Closed subcapital fracture of femur, right, initial encounter ([address]) 10/13/2023        Past Medical History:   Diagnosis Date    Thyroid disease      Past Surgical History:   Procedure Laterality Date    FRACTURE SURGERY      R ankle     HIP SURGERY Right 10/14/2023    PERCUTANEOUS SCREW FIXATION RIGHT FEMORAL NECK performed by Heather Madrigal DO at [address]       Chart Reviewed: Yes  Family / Caregiver Present: No    Restrictions:  Restrictions/Precautions: Weight Bearing; Fall Risk  Lower Extremity Weight Bearing Restrictions  Right Lower Extremity Weight Bearing: Toe Touch Weight Bearing    SUBJECTIVE:   Subjective: \"It's all coming back to me, I broke my leg and I'm in the hospital.\"    Pain  Pain: 5/10 RLE. pre/post tx. OBJECTIVE:        Bed mobility  Supine to Sit: Stand by assistance  Sit to Supine:  (DNT pt into bedside chair,)  Bed Mobility Comments: bed flat, pt comes into long sitting then pivots LE's over to EOB. vc's for improved efficiency. Transfers  Sit to Stand: Minimal Assistance  Stand to Sit: Minimal Assistance  Bed to Chair: Minimal assistance  Comment: pt maintains TTWB RLE with minimal cues. pt uses [facility] to pivot to bedside chair. pt does well this session, demos understanding of WB restrictions. PT Exercises  A/AROM Exercises: seated AP/LAQ/Hip add x 10.           Activity Tolerance  Activity Tolerance: Patient tolerated treatment well          ASSESSMENT   Assessment: pt demos improved cognition and ability to maintain TTWB RLE. Discharge Recommendations:  Continue to assess pending progress         Goals  Short Term Goals  Short Term Goal 1: Pt will demonstrate R LE TTWBing during all standing functional mobility with SBA  Long Term Goals  Long Term Goal 1: Pt will demonstrate bed mobility SBA  Long Term Goal 2: Pt will demonstrate transfers SBA with safest AD and R LE TTWBing SBA  Long Term Goal 3: Pt will demonstrate amb >/= 30ft SBA with safest AD with R LE TTWBing SBA  Long Term Goal 4: Pt will demonstrate w/c mobility >/= 100ft supervision    PLAN    General Plan: 2 times a day 7 days a week  Safety Devices  Type of Devices: Call light within reach, Chair alarm in place, Left in chair, Sitter present, Nurse notified     Desiree Thomas (6 CLICK) [address] Mobility Raw Score : 15      Therapy Time   Individual   Time In 0811   Time Out 0824   Minutes 13      BM/trsf: 10  Therex: 3        Bernie Million, PTA, 10/17/23 at 9:58 AM         Definitions for assistance levels  Independent = pt does not require any physical supervision or assistance from another person for activity completion. Device may be needed.   Stand by assistance = pt requires verbal cues or instructions from another person, close to but not touching, to perform the activity  Minimal assistance= pt performs 75% or more of the activity; assistance is required to complete the activity  Moderate assistance= pt performs 50% of the activity; assistance is required to complete the activity  Maximal assistance = pt performs 25% of the activity; assistance is required to complete the activity  Dependent = pt requires total physical assistance to accomplish the task

## 2023-10-17 NOTE — PLAN OF CARE
Problem: Pain  Goal: Verbalizes/displays adequate comfort level or baseline comfort level  10/16/2023 2352 by Garth Guy RN  Outcome: Progressing  10/16/2023 1442 by Pieter Alvarez RN  Outcome: Progressing     Problem: Skin/Tissue Integrity  Goal: Absence of new skin breakdown  Description: 1. Monitor for areas of redness and/or skin breakdown  2. Assess vascular access sites hourly  3. Every 4-6 hours minimum:  Change oxygen saturation probe site  4. Every 4-6 hours:  If on nasal continuous positive airway pressure, respiratory therapy assess nares and determine need for appliance change or resting period. 10/16/2023 2352 by Garth Guy RN  Outcome: Progressing  10/16/2023 1442 by Pieter Alvarez RN  Outcome: Progressing     Problem: Safety - Adult  Goal: Free from fall injury  10/16/2023 2352 by Garth Guy RN  Outcome: Progressing  10/16/2023 1442 by Pieter Alvarez RN  Outcome: Progressing     Problem: ABCDS Injury Assessment  Goal: Absence of physical injury  10/16/2023 2352 by Garth Guy RN  Outcome: Progressing  10/16/2023 1442 by Pieter Alvarez RN  Outcome: Progressing     Problem: Confusion  Goal: Confusion, delirium, dementia, or psychosis is improved or at baseline  Description: INTERVENTIONS:  1. Assess for possible contributors to thought disturbance, including medications, impaired vision or hearing, underlying metabolic abnormalities, dehydration, psychiatric diagnoses, and notify attending LIP  2. [place] high risk fall precautions, as indicated  3. Provide frequent short contacts to provide reality reorientation, refocusing and direction  4. Decrease environmental stimuli, including noise as appropriate  5. Monitor and intervene to maintain adequate nutrition, hydration, elimination, sleep and activity  6. If unable to ensure safety without constant attention obtain sitter and review sitter guidelines with assigned personnel  7.  Initiate Psychosocial CNS and Spiritual

## 2023-10-17 NOTE — PROGRESS NOTES
Physical Therapy Med Surg Daily Treatment Note  Facility/Department: [facility]  [facility]       NAME: Joe Maddox  : 11/3/1929 (80 y.o.)  MRN: [id number]  CODE STATUS: Full Code    Date of Service: 10/17/2023    Patient Diagnosis(es): T wave inversion in EKG [R94.31]  Closed fracture of right hip, initial encounter [facility]) [S72.001A]  Closed subcapital fracture of femur, right, initial encounter [facility]) [S72.011A]   Chief Complaint   Patient presents with    Fall     Rt hip pain      Patient Active Problem List    Diagnosis Date Noted    Closed subcapital fracture of femur, right, initial encounter ([address]) 10/13/2023        Past Medical History:   Diagnosis Date    Thyroid disease      Past Surgical History:   Procedure Laterality Date    FRACTURE SURGERY      R ankle     HIP SURGERY Right 10/14/2023    PERCUTANEOUS SCREW FIXATION RIGHT FEMORAL NECK performed by Chrissy Murcia DO at [address]       Chart Reviewed: Yes  Family / Caregiver Present: No  General Comment  Comments: pt more confused this PM session. needs education on weight bearing restrictions. Restrictions:  Restrictions/Precautions: Weight Bearing; Fall Risk  Lower Extremity Weight Bearing Restrictions  Right Lower Extremity Weight Bearing: Toe Touch Weight Bearing    SUBJECTIVE:   Subjective: \"Is it really 2 O'clock already? \"    Pain  Pain: 3/10 RLE. pre/post tx. OBJECTIVE:        Bed mobility  Supine to Sit:  (DNT pt in chair at start of session.)  Sit to Supine: Moderate assistance (pt needs assistance to lift BLE into bed this session.)  Bed Mobility Comments: bed flat, pt comes into long sitting then pivots LE's over to EOB. vc's for improved efficiency. Transfers  Sit to Stand: Maximum Assistance  Stand to Sit: Maximum Assistance  Bed to Chair: Maximum assistance  Comment: Max A needed to maintain TTWB RLE.  decreased cognition and ability to understand/maintain WB restriction this PM. impulsive, poor direction following

## 2023-10-17 NOTE — DISCHARGE INSTR - COC
whole    Wound Care Documentation and Therapy:  Incision 10/14/23 Hip Right (Active)   Dressing Status Breakthrough drainage noted 10/17/23 0944   Dressing/Treatment Other (comment); Open to air 10/17/23 0944   Closure Karl; Adhesive bandage 10/17/23 0944   Margins Approximated 10/17/23 0944   Drainage Amount None (dry) 10/17/23 0944   Number of days: 3        Elimination:  Continence: Bowel: Yes  Bladder: Yes  Urinary Catheter: None   Colostomy/Ileostomy/Ileal Conduit: No       Date of Last BM: 10/19  No intake or output data in the 24 hours ending 10/17/23 1427  No intake/output data recorded. Safety Concerns:     [place], History of Falls (last 30 days), and At Risk for Falls    Impairments/Disabilities:      Hearing    Nutrition Therapy:  Current Nutrition Therapy:   - Oral Diet:  General    Routes of Feeding: Oral  Liquids: Thin Liquids  Daily Fluid Restriction: no  Last Modified Barium Swallow with Video (Video Swallowing Test): not done    Treatments at the Time of Hospital Discharge:   Respiratory Treatments: ***  Oxygen Therapy:  is not on home oxygen therapy.   Ventilator:    - No ventilator support    Rehab Therapies: Physical Therapy and Occupational Therapy  Weight Bearing Status/Restrictions: Touchdown weight bearing (10-25 lbs) only on right leg  Other Medical Equipment (for information only, NOT a DME order):  walker  Other Treatments: ***    Patient's personal belongings (please select all that are sent with patient):  None    RN SIGNATURE:  Electronically signed by Kelly Medina RN on 10/20/23 at 12:27 PM EDT    CASE MANAGEMENT/SOCIAL WORK SECTION    Inpatient Status Date: 10/13/2023    Readmission Risk Assessment Score:  Readmission Risk              Risk of Unplanned Readmission:  11           Discharging to Facility/ Agency   Name: [id number]  Address:  Phone:  Fax:    Dialysis Facility (if applicable)   Name:  Address:  Dialysis Schedule:  Phone:  Fax:    Case Manager/ signature: Electronically signed by WILEY Riddle on 10/17/23 at 2:27 PM EDT    PHYSICIAN SECTION    Prognosis: Good    Condition at Discharge: Stable    Recommended Labs or Other Treatments After Discharge: Follow up with Orthopedics (Dr. Randy Awad) in 2 weeks for a post operative evaluation and staple removal   Patient will require Aspirin 81mg BID for 6 weeks until 12/1/2023. Physician Certification: I certify the above information and transfer of Esperanza Barry  is necessary for the continuing treatment of the diagnosis listed and that she requires [address] for less 30 days.      Update Admission H&P: No change in H&P    PHYSICIAN ASSISTANT SIGNATURE:  Electronically signed by Osito Byrne PA-C on 10/20/23 at 12:06 PM EDT

## 2023-10-18 PROCEDURE — 2580000003 HC RX 258: Performed by: PHYSICIAN ASSISTANT

## 2023-10-18 PROCEDURE — 1210000000 HC MED SURG R&B

## 2023-10-18 PROCEDURE — 6370000000 HC RX 637 (ALT 250 FOR IP): Performed by: SURGERY

## 2023-10-18 PROCEDURE — 6360000002 HC RX W HCPCS

## 2023-10-18 PROCEDURE — 6370000000 HC RX 637 (ALT 250 FOR IP): Performed by: PHYSICIAN ASSISTANT

## 2023-10-18 PROCEDURE — 6370000000 HC RX 637 (ALT 250 FOR IP)

## 2023-10-18 PROCEDURE — 97116 GAIT TRAINING THERAPY: CPT

## 2023-10-18 RX ORDER — OXYCODONE HYDROCHLORIDE 5 MG/1
2.5 TABLET ORAL EVERY 4 HOURS PRN
Status: DISCONTINUED | OUTPATIENT
Start: 2023-10-18 | End: 2023-10-20 | Stop reason: HOSPADM

## 2023-10-18 RX ADMIN — Medication 6 ML: at 20:33

## 2023-10-18 RX ADMIN — Medication 5 ML: at 08:54

## 2023-10-18 RX ADMIN — Medication 5 MG: at 00:02

## 2023-10-18 RX ADMIN — LEVOTHYROXINE SODIUM 75 MCG: 0.07 TABLET ORAL at 05:58

## 2023-10-18 RX ADMIN — QUETIAPINE FUMARATE 12.5 MG: 25 TABLET ORAL at 20:25

## 2023-10-18 RX ADMIN — POLYETHYLENE GLYCOL 3350 17 G: 17 POWDER, FOR SOLUTION ORAL at 08:53

## 2023-10-18 RX ADMIN — SENNOSIDES, DOCUSATE SODIUM 1 TABLET: 8.6; 5 TABLET ORAL at 08:51

## 2023-10-18 RX ADMIN — OXYCODONE HYDROCHLORIDE 5 MG: 5 TABLET ORAL at 00:01

## 2023-10-18 RX ADMIN — HEPARIN SODIUM 5000 UNITS: 5000 INJECTION INTRAVENOUS; SUBCUTANEOUS at 14:00

## 2023-10-18 RX ADMIN — ACETAMINOPHEN 650 MG: 325 TABLET ORAL at 20:24

## 2023-10-18 RX ADMIN — SENNOSIDES, DOCUSATE SODIUM 1 TABLET: 8.6; 5 TABLET ORAL at 20:25

## 2023-10-18 RX ADMIN — ACETAMINOPHEN 650 MG: 325 TABLET ORAL at 05:58

## 2023-10-18 RX ADMIN — HEPARIN SODIUM 5000 UNITS: 5000 INJECTION INTRAVENOUS; SUBCUTANEOUS at 20:25

## 2023-10-18 RX ADMIN — HEPARIN SODIUM 5000 UNITS: 5000 INJECTION INTRAVENOUS; SUBCUTANEOUS at 05:58

## 2023-10-18 ASSESSMENT — PAIN DESCRIPTION - ORIENTATION: ORIENTATION: LEFT;RIGHT

## 2023-10-18 ASSESSMENT — PAIN DESCRIPTION - LOCATION: LOCATION: HIP;KNEE;LEG

## 2023-10-18 ASSESSMENT — PAIN SCALES - GENERAL: PAINLEVEL_OUTOF10: 7

## 2023-10-18 ASSESSMENT — PAIN DESCRIPTION - DESCRIPTORS: DESCRIPTORS: ACHING

## 2023-10-18 NOTE — FLOWSHEET NOTE
Stephany 1:1 positioned at this time. Moved out in martin to see how pt responds. Pt resting in bed at this time. Electronically signed by Osvaldo Burnette LPN on 97/51/9010 at 7:15 AM    Pt continues to rest with both eyes closed. Will continue to monitor. Electronically signed by Osvaldo Burnette LPN on 95/87/9371 at 8:22 AM  Pt called for nurse entered room and she told me she had to use the bathroom, assisted her to the bed side commode and then to the chair. If you talk in her right ear she can hear you. Breakfast set up for her and she asked for coffee 2 sugars and 1 cream.  Obtained coffee pt eating now and 1 waffle, 2 pederson and lg amount of eggs 1 cranberry juice,1 orange juice,1 milk and 1 coffee. Pt watching tv and asked me why are those people killing each other. Call light explained to pt and told her to push the red button when she needs me, I 'm sitting out side her room to see if she will call me. Electronically signed by Osvaldo Burnette LPN on 65/21/7230 at 9:08 AM  Pt remains up in chair playing [place] the word and other books. Pt could not see me in the martin and forgot to put light on but instead called nurse. :ooked in room pt still in chair but said she needed to use the bathroom. Showed pt the paper that says do not get up call nurse first. She said that is what she did just forgot to keep reading where it told her to put light on. Assisted pt to the bathroom with walker and she said I can't walk on my right foot told he yes then she said how do I walk tolf her to use her arms to hold herself up she then said that's what that girl told me this AM and lifted her self up and made it to the bathroom slow voided 300cc yellow urine and then walked back not putting any weight on that leg. Spirtal care in room and they prayed together and now is playing her book puzzles. Electronically signed by Osvaldo Burnette LPN on 13/47/7418 at 12:07 PM  Pt was taken off 1:1and monitored frpm the martin.

## 2023-10-18 NOTE — PROGRESS NOTES
Physical Therapy Med Surg Daily Treatment Note  Facility/Department: [facility]  Room: J792/O256-20       NAME: Raven Owens  : 11/3/1929 (80 y.o.)  MRN: [id number]  CODE STATUS: Full Code    Date of Service: 10/18/2023    Patient Diagnosis(es): T wave inversion in EKG [R94.31]  Closed fracture of right hip, initial encounter [facility]) [S72.001A]  Closed subcapital fracture of femur, right, initial encounter [facility]) [S72.011A]   Chief Complaint   Patient presents with    Fall     Rt hip pain      Patient Active Problem List    Diagnosis Date Noted    Closed subcapital fracture of femur, right, initial encounter ([address]) 10/13/2023        Past Medical History:   Diagnosis Date    Thyroid disease      Past Surgical History:   Procedure Laterality Date    FRACTURE SURGERY      R ankle     HIP SURGERY Right 10/14/2023    PERCUTANEOUS SCREW FIXATION RIGHT FEMORAL NECK performed by Alexis Marrero DO at [address]            Restrictions:  Restrictions/Precautions: Weight Bearing; Fall Risk  Lower Extremity Weight Bearing Restrictions  Right Lower Extremity Weight Bearing: Toe Touch Weight Bearing    SUBJECTIVE:   Subjective: Thank you for working with me. Pain  Pain: 4/10 pre and post tx: declined intervention. Nursing aware. OBJECTIVE:        Bed mobility  Bed Mobility Comments: Not tested: up in chair upon arrival and wanting to stay in chair after gait completed. Transfers  Sit to Stand: Minimal Assistance  Stand to Sit: Minimal Assistance  Bed to Chair: Minimal assistance    Ambulation  WB Status: TTWB RLE  Ambulation  Surface: Level tile  Device: Rolling Walker  Assistance: Minimal assistance  Quality of Gait: Pt initially allowed to place foot on this therapist's foot but only maintained TTWB 50% of time for 4 steps. Changed to NWB technique and pt able to maintain 75% of the time with rest of gait.   Gait Deviations: Slow Aislinn;Decreased step length;Decreased step height  Distance: 8' with turn needed.   Stand by assistance = pt requires verbal cues or instructions from another person, close to but not touching, to perform the activity  Minimal assistance= pt performs 75% or more of the activity; assistance is required to complete the activity  Moderate assistance= pt performs 50% of the activity; assistance is required to complete the activity  Maximal assistance = pt performs 25% of the activity; assistance is required to complete the activity  Dependent = pt requires total physical assistance to accomplish the task

## 2023-10-18 NOTE — PROGRESS NOTES
Physical Therapy Med Surg Daily Treatment Note  Fac[facility]Department: [facility]       NAME: Roma Quiroz  : 11/3/1929 (80 y.o.)  MRN: [id number]  CODE STATUS: Full Code    Date of Service: 10/18/2023    Patient Diagnosis(es): T wave inversion in EKG [R94.31]  Closed fracture of right hip, initial encounter [facility]) [S72.001A]  Closed subcapital fracture of femur, right, initial encounter [facility]) [S72.011A]   Chief Complaint   Patient presents with    Fall     Rt hip pain      Patient Active Problem List    Diagnosis Date Noted    Closed subcapital fracture of femur, right, initial encounter ([address]) 10/13/2023        Past Medical History:   Diagnosis Date    Thyroid disease      Past Surgical History:   Procedure Laterality Date    FRACTURE SURGERY      R ankle     HIP SURGERY Right 10/14/2023    PERCUTANEOUS SCREW FIXATION RIGHT FEMORAL NECK performed by Gloria Crespo DO at [address]            Restrictions:  Restrictions/Precautions: Weight Bearing; Fall Risk  Lower Extremity Weight Bearing Restrictions  Right Lower Extremity Weight Bearing: Toe Touch Weight Bearing    SUBJECTIVE:   Subjective: I'm glad you're back! I have to go pee. Pain  Pain: 0/10 pre and post.    OBJECTIVE:        Bed mobility  Rolling to Left: Stand by assistance  Rolling to Right: Stand by assistance  Supine to Sit: Unable to assess  Sit to Supine: Stand by assistance  Scooting: Stand by assistance  Bed Mobility Comments: Pt returned to bed at end of session. Pt needing cues to maintain WB status even with scooting up in bed at EOB. Transfers  Sit to Stand: Minimal Assistance (cues for hand placement and to hold RLE off of floor. Pt easily confused vs Jackson.)  Stand to Sit: Minimal Assistance  Bed to Chair: Minimal assistance  Comment: Toilet transfer Minimal assist.  Pt able to manage own hygiene.     Ambulation  WB Status: TTWB RLE  Ambulation  Surface: Level tile  Device: Rolling Walker  Assistance: Minimal

## 2023-10-19 LAB
ANION GAP SERPL CALCULATED.3IONS-SCNC: 8 MEQ/L (ref 9–15)
BASOPHILS # BLD: 0 K/UL (ref 0–0.2)
BASOPHILS NFR BLD: 0.4 %
BUN SERPL-MCNC: 27 MG/DL (ref 8–23)
CALCIUM SERPL-MCNC: 9 MG/DL (ref 8.5–9.9)
CHLORIDE SERPL-SCNC: 104 MEQ/L (ref 95–107)
CO2 SERPL-SCNC: 29 MEQ/L (ref 20–31)
CREAT SERPL-MCNC: 0.95 MG/DL (ref 0.5–0.9)
EOSINOPHIL # BLD: 0.4 K/UL (ref 0–0.7)
EOSINOPHIL NFR BLD: 4.9 %
ERYTHROCYTE [DISTWIDTH] IN BLOOD BY AUTOMATED COUNT: 13.1 % (ref 11.5–14.5)
GLUCOSE SERPL-MCNC: 106 MG/DL (ref 70–99)
HCT VFR BLD AUTO: 36.2 % (ref 37–47)
HGB BLD-MCNC: 11.8 G/DL (ref 12–16)
LYMPHOCYTES # BLD: 2.1 K/UL (ref 1–4.8)
LYMPHOCYTES NFR BLD: 28.7 %
MCH RBC QN AUTO: 29.9 PG (ref 27–31.3)
MCHC RBC AUTO-ENTMCNC: 32.6 % (ref 33–37)
MCV RBC AUTO: 91.9 FL (ref 79.4–94.8)
MONOCYTES # BLD: 0.8 K/UL (ref 0.2–0.8)
MONOCYTES NFR BLD: 10.6 %
NEUTROPHILS # BLD: 4.1 K/UL (ref 1.4–6.5)
NEUTS SEG NFR BLD: 54.9 %
PLATELET # BLD AUTO: 241 K/UL (ref 130–400)
POTASSIUM SERPL-SCNC: 4.4 MEQ/L (ref 3.4–4.9)
RBC # BLD AUTO: 3.94 M/UL (ref 4.2–5.4)
SODIUM SERPL-SCNC: 141 MEQ/L (ref 135–144)
WBC # BLD AUTO: 7.4 K/UL (ref 4.8–10.8)

## 2023-10-19 PROCEDURE — 6360000002 HC RX W HCPCS

## 2023-10-19 PROCEDURE — 97116 GAIT TRAINING THERAPY: CPT

## 2023-10-19 PROCEDURE — 80048 BASIC METABOLIC PNL TOTAL CA: CPT

## 2023-10-19 PROCEDURE — 1210000000 HC MED SURG R&B

## 2023-10-19 PROCEDURE — 6370000000 HC RX 637 (ALT 250 FOR IP)

## 2023-10-19 PROCEDURE — 6370000000 HC RX 637 (ALT 250 FOR IP): Performed by: SURGERY

## 2023-10-19 PROCEDURE — 36415 COLL VENOUS BLD VENIPUNCTURE: CPT

## 2023-10-19 PROCEDURE — 6370000000 HC RX 637 (ALT 250 FOR IP): Performed by: PHYSICIAN ASSISTANT

## 2023-10-19 PROCEDURE — 85025 COMPLETE CBC W/AUTO DIFF WBC: CPT

## 2023-10-19 PROCEDURE — 2580000003 HC RX 258: Performed by: PHYSICIAN ASSISTANT

## 2023-10-19 RX ADMIN — SENNOSIDES, DOCUSATE SODIUM 1 TABLET: 8.6; 5 TABLET ORAL at 10:00

## 2023-10-19 RX ADMIN — LEVOTHYROXINE SODIUM 75 MCG: 0.07 TABLET ORAL at 07:24

## 2023-10-19 RX ADMIN — Medication 10 ML: at 21:50

## 2023-10-19 RX ADMIN — Medication 5 ML: at 10:00

## 2023-10-19 RX ADMIN — ACETAMINOPHEN 650 MG: 325 TABLET ORAL at 21:49

## 2023-10-19 RX ADMIN — HEPARIN SODIUM 5000 UNITS: 5000 INJECTION INTRAVENOUS; SUBCUTANEOUS at 21:50

## 2023-10-19 RX ADMIN — SENNOSIDES, DOCUSATE SODIUM 1 TABLET: 8.6; 5 TABLET ORAL at 21:49

## 2023-10-19 RX ADMIN — HEPARIN SODIUM 5000 UNITS: 5000 INJECTION INTRAVENOUS; SUBCUTANEOUS at 07:24

## 2023-10-19 RX ADMIN — QUETIAPINE FUMARATE 12.5 MG: 25 TABLET ORAL at 21:49

## 2023-10-19 RX ADMIN — ACETAMINOPHEN 650 MG: 325 TABLET ORAL at 10:55

## 2023-10-19 NOTE — PROGRESS NOTES
[facility]  Occupational Therapy        NAME:  Rhiannon Yeh  ROOM: X033/J460-06  :  11/3/1929  DATE: 10/19/2023    Attempted to see Rhiannon Yeh 15:55 on this date for:   []  Initial Evaluation   [x]  Treatment     Patient was unable to be seen due to:   [] Off unit for testing/procedure    [x] Patient declined   [] Therapy on hold due to     [] Nursing deferred due to    [] Other:      Pt sleeping I bed upon arrival. Pt offered to get washed up, brush teeth, brush hair or do arm exercises, pt declined. Encouragement provided. Pt stated she wanted to go back to sleep.      Electronically signed by GINNA Good on 10/19/23 at 4:01 PM EDT

## 2023-10-19 NOTE — PROGRESS NOTES
Deviations: Slow Aislinn;Decreased step length;Decreased step height  Distance: 8' with turns  More Ambulation?: No                                         ASSESSMENT   Body Structures, Functions, Activity Limitations Requiring Skilled Therapeutic Intervention: Decreased functional mobility ; Decreased ROM; Decreased strength;Decreased safe awareness;Decreased cognition;Decreased endurance;Decreased balance  Assessment: Pt with improved compliance with TTWB using NWB technique but with poor carry over to remember her restrictions. Discharge Recommendations:  Continue to assess pending progress         Goals  Short Term Goals  Short Term Goal 1: Pt will demonstrate R LE TTWBing during all standing functional mobility with SBA  Long Term Goals  Long Term Goal 1: Pt will demonstrate bed mobility SBA  Long Term Goal 2: Pt will demonstrate transfers SBA with safest AD and R LE TTWBing SBA  Long Term Goal 3: Pt will demonstrate amb >/= 30ft SBA with safest AD with R LE TTWBing SBA  Long Term Goal 4: Pt will demonstrate w/c mobility >/= 100ft supervision    PLAN    General Plan: 2 times a day 7 days a week  Safety Devices  Type of Devices: Left in chair, Call light within reach, Chair alarm in place, Sitter present     AMPAC (6 CLICK) BASIC MOBILITY  AM-PAC Inpatient Mobility Raw Score : 18     Therapy Time   Individual   Time In 0802   Time Out 0810   Minutes 8    Minutes: 8  Bed Mobility: 4  Gait: 4          Susan Gallo PTA, 10/19/23 at 8:41 AM         Definitions for assistance levels  Independent = pt does not require any physical supervision or assistance from another person for activity completion. Device may be needed.   Stand by assistance = pt requires verbal cues or instructions from another person, close to but not touching, to perform the activity  Minimal assistance= pt performs 75% or more of the activity; assistance is required to complete the activity  Moderate assistance= pt performs 50% of the

## 2023-10-20 VITALS
RESPIRATION RATE: 16 BRPM | BODY MASS INDEX: 20.8 KG/M2 | DIASTOLIC BLOOD PRESSURE: 75 MMHG | TEMPERATURE: 97.9 F | WEIGHT: 113 LBS | OXYGEN SATURATION: 98 % | HEIGHT: 62 IN | HEART RATE: 78 BPM | SYSTOLIC BLOOD PRESSURE: 160 MMHG

## 2023-10-20 PROBLEM — G89.18 ACUTE POST-OPERATIVE PAIN: Status: ACTIVE | Noted: 2023-10-14

## 2023-10-20 PROBLEM — F03.90 DEMENTIA (HCC): Status: ACTIVE | Noted: 2023-10-13

## 2023-10-20 PROBLEM — G89.11 ACUTE PAIN DUE TO TRAUMA: Status: ACTIVE | Noted: 2023-10-13
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
## 2023-10-20 PROBLEM — R41.0 DELIRIUM: Status: ACTIVE | Noted: 2023-10-16

## 2023-10-20 PROCEDURE — 6360000002 HC RX W HCPCS

## 2023-10-20 PROCEDURE — 6370000000 HC RX 637 (ALT 250 FOR IP): Performed by: PHYSICIAN ASSISTANT

## 2023-10-20 PROCEDURE — 2580000003 HC RX 258: Performed by: PHYSICIAN ASSISTANT

## 2023-10-20 PROCEDURE — 97535 SELF CARE MNGMENT TRAINING: CPT

## 2023-10-20 PROCEDURE — 6370000000 HC RX 637 (ALT 250 FOR IP)

## 2023-10-20 RX ORDER — LIDOCAINE 4 G/G
1 PATCH TOPICAL DAILY
Qty: 10 EACH | Refills: 0 | DISCHARGE
Start: 2023-10-21 | End: 2023-10-31

## 2023-10-20 RX ORDER — ASPIRIN 81 MG/1
81 TABLET ORAL 2 TIMES DAILY
Qty: 84 TABLET | Refills: 0 | DISCHARGE
Start: 2023-10-20 | End: 2023-12-01

## 2023-10-20 RX ORDER — MECOBALAMIN 5000 MCG
5 TABLET,DISINTEGRATING ORAL NIGHTLY
Qty: 15 TABLET | Refills: 0 | DISCHARGE
Start: 2023-10-20 | End: 2023-11-04

## 2023-10-20 RX ORDER — ACETAMINOPHEN 325 MG/1
650 TABLET ORAL EVERY 6 HOURS
Qty: 120 TABLET | Refills: 0 | DISCHARGE
Start: 2023-10-20 | End: 2023-11-04

## 2023-10-20 RX ADMIN — HEPARIN SODIUM 5000 UNITS: 5000 INJECTION INTRAVENOUS; SUBCUTANEOUS at 14:13

## 2023-10-20 RX ADMIN — SENNOSIDES, DOCUSATE SODIUM 1 TABLET: 8.6; 5 TABLET ORAL at 10:07

## 2023-10-20 RX ADMIN — HEPARIN SODIUM 5000 UNITS: 5000 INJECTION INTRAVENOUS; SUBCUTANEOUS at 06:16

## 2023-10-20 RX ADMIN — LEVOTHYROXINE SODIUM 75 MCG: 0.07 TABLET ORAL at 06:16

## 2023-10-20 RX ADMIN — ACETAMINOPHEN 650 MG: 325 TABLET ORAL at 14:13

## 2023-10-20 RX ADMIN — Medication 10 ML: at 10:07

## 2023-10-20 RX ADMIN — ACETAMINOPHEN 650 MG: 325 TABLET ORAL at 06:15

## 2023-10-20 ASSESSMENT — PAIN SCALES - GENERAL: PAINLEVEL_OUTOF10: 0

## 2023-10-20 NOTE — DISCHARGE INSTRUCTIONS
Discharge Instructions    Date of admission: 10/13/2023  Date of discharge: 10/20/23      You are being discharged to a skilled nursing facility    Follow up:  - Please call to schedule your follow-up appointments - information provided separately. - You will need to follow up with: Follow up with Orthopedics (Dr. Nicol Polanco) in 2 weeks for a post operative evaluation and staple removal   No need for Trauma follow-up  - See below for information regarding contacting your primary care physician or establishing care with [facility]) if you do not already have one. Restrictions:  - Toe touch weight bearing to RLE  - Maintain these restrictions until you are cleared by your provider at your follow up appointment. Pain control:  - Take Tylenol 325 mg, 1-2 tablets every 4 hours as needed for mild to moderate pain. Mild to moderate pain is characterized by pain 1-6 out of 10 on a pain scale. - You may use Motrin and Tylenol until your pain is diminished enough for you to tolerate your pain. - Your pain medication may be adjusted or discontinued in follow-up appointments, or by the supervising physician in an inpatient nursing setting. Per trauma program protocol, patient DOES REQUIRE post-discharge VTE prophylaxis. Patient will require Aspirin 81mg BID for 6 weeks until 12/1/2023. Wound Care and Showering/Bathing:  - Okay to shower daily -- allow soap and water to run down any incisions sites. Do not scrub the area. - If you cannot maintain your balance in the shower, then you should not shower. Sponge baths are the best way to maintain hygiene while your are healing.  - To sponge bath, wet a washcloth with soapy water and gently wash body with the washcloth. Then use a dry washcloth to wipe off.   - No submerging the wound in water or pools until cleared by our office.  - If you notice any increased redness swelling or drainage from your wound call our office immediately.   - You may ice your

## 2023-10-20 NOTE — PROGRESS NOTES
[facility]  Occupational Therapy        NAME:  Marco Antonio Collazo  ROOM: M668/R624-60  :  11/3/1929  DATE: 10/20/2023    Attempted to see Marco Antonio Collazo at [phone] on this date for:   []  Initial Evaluation   [x]  Treatment       Patient was unable to be seen due to:   [] Off unit for testing/procedure    [x] Patient refused   [] Therapy on hold due to     [] Nursing deferred due to    [] Other:      Pt in bed resting upon arrival with 1:1 sitter present in room. Per sitter, pt is Rafi Race a bad morning\". Pt disoriented and confused reporting that her parents are present in the room with her. Reoriented pt and offered to assist her with morning ADLs. Pt declined reporting that she already completed. Educated pt that sitter has not washed her up yet today and that therapy can help her. Pt declined reporting anxiety and that she does not want to disturb her parents and family in the room. Pt unable to be reoriented or redirected to participate in therapy at this time. Will attempt again as able.      Electronically signed by GINNA Hallman on 10/20/23 at 11:29 AM EDT

## 2023-10-20 NOTE — DISCHARGE SUMMARY
[address] [facility], [address] [facility]  MRN: [id number]  YOB: 1929  80 y. o.female      Attending  Jennifer Linda MD ?   Date of Admission  10/13/2023 Date of Discharge  10/20/23      ? DIAGNOSES:  Principal Problem:    Closed subcapital fracture of femur, right, initial encounter [facility])  Active Problems:    Fall from standing    Acute pain due to trauma    Acute post-operative pain    Delirium    Dementia ([address] [facility])  Resolved Problems:    * No resolved hospital problems. *         PROCEDURES:  10/14/2023: PERCUTANEOUS SCREW FIXATION LEFT FEMORAL NECK by Dr. Max Rodriguez, DO.   ? DISCHARGE MEDICATIONS:   Current Discharge Medication List             Details   melatonin 5 MG TBDP disintegrating tablet Take 1 tablet by mouth nightly for 15 days  Qty: 15 tablet, Refills: 0      lidocaine 4 % external patch Place 1 patch onto the skin daily for 10 days  Qty: 10 each, Refills: 0      acetaminophen (TYLENOL) 325 MG tablet Take 2 tablets by mouth every 6 hours for 15 days  Qty: 120 tablet, Refills: 0      aspirin 81 MG EC tablet Take 1 tablet by mouth in the morning and at bedtime  Qty: 84 tablet, Refills: 0                Details   levothyroxine (SYNTHROID) 75 MCG tablet Take 75 mcg by mouth Daily             REASON FOR HOSPITALIZATION:  Wei Chavez is a 80 y.o. female with PMH of hypothyroidism who presented to [facility] s/p mechanical fall from standing. This was a witnessed fall. Patient landed on her R hip, though complaining of minimal pain. (-)Head Strike, (-)LOC, (-)AC/AP. Trauma and Orthopedics consulted, patient admitted for operative intervention of minimally displaced right femoral neck fracture.       SIGNIFICANT FINDINGS:  Catalog of Injuries:   Fall from standing  Acute pain due to trauma  Acute post-op pain  R femoral neck fracture  Acute delirium, improving  Dementia    Incidental Findings: [facility] Patient will require Aspirin 81mg BID for 6 weeks until 12/1/2023. --  Justino Churchill PA-C  Trauma/Critical Care/ Emergency General Surgery    [see treatment team sticky note for contact information]      >30 minutes were spent on the discharge of this patient including final examination of the patient, discussion of the hospital stay, instructions for continuing care to all relevant caregivers, preparation of discharge records, prescriptions and referral forms, and clear identification of reasons to return to clinic or to emergency room.

## 2023-10-20 NOTE — PROGRESS NOTES
Patient cleared to discharge to [facility] facility. Report given to betty. Family updated on discharge plans.

## 2023-10-20 NOTE — PROGRESS NOTES
[facility]  Occupational Therapy        NAME:  Esperanza Barry  ROOM: S457/P494-91  :  11/3/1929  DATE: 10/20/2023    Attempted to see Esperanza Barry at 9617 on this date for:   []  Initial Evaluation   [x]  Treatment       Patient was unable to be seen due to:   [] Off unit for testing/procedure    [] Patient refused, stating \"    [] Therapy on hold due to     [] Nursing deferred due to    [x] Other:  Pt eating breakfast at this time.      Electronically signed by GINNA Bonner on 10/20/23 at 9:23 AM EDT

## 2023-10-20 NOTE — CARE COORDINATION
LSW RECEIVED A CALL FROM THE PT'S SON AND HE SAID THAT HE WOULD BE HERE TODAY AROUND 1PM TO DISCUSS THE DC PLAN AND GIVE THIS LSW THE TOP CHOICES FOR SNF. AT AROUND 1PM HE CALLED TO SAY THAT HE WOULD NOT BE ABLE TO MAKE IT HERE TO MEET WITH ME TODAY. LSW ASKED FOR THE TOP SNF CHOICES AND PT'S SON SAID THAT THEY WANT TO VISIT THEIR TOP CHOICES BEFORE GIVING THEM TO US TO PERSUE APPROVAL. PT'S SON SAYS THAT HE WILL BE HERE IN THE MORNING TO MEET WITH THIS LSW. PRECERT WILL BE NEEDED AS WELL AS SITTER CARE HIRED PRIOR TO THE PT BEING ABLE TO DC TO A SNF. LSW CANNOT PROCEED WITHOUT FAMILY DECISION.
LSW SPOKE WITH PT'S SON AND HE CHOSE [facility] . REFERRAL SENT TO [facility] FOR REVIEW. SITTER CARE STILL NEEDED. LSW CALLED [facility] AND THEY COULD ASSIST WITH CARE AT $30 PER HOUR.  LSW CALLED [facility] AND THEY CAN ASSIST WITH CARE, NIGHT TIME RATES ARE 33.95 PER HOUR AND DAYTIME IS 32.95.  LSW TO FOLLOW. LSW SPOKE WITH [facility] AND [facility] HAS ACCEPTED THE PT IN THEIR DEMENTIA UNIT.  THEY WILL START THE PRECERT TODAY. WE WILL SEE HOW THE PT DOES OVERNIGHT WITHOUT SITTER CARE. IF SHE DOES WELL THEN PRIVATE HIRE SITTER MAY NOT BE REQUIRED AT TRANSFER. FAMILY UPDATED.
P2P was completed and the denial was overturned. Pt can be transported to [facility] today. [facility] 1:30pm.  Nursing aware and informed son.
PER [facility] WE RECEIVED A DENIAL FOR THE PT TO TRANSFER TO SNF. THE P2P IS REQUESTED TO BE COMPLETED BY NOON TOMORROW. [phone] OPTION 4.  REFERENCE # [id number].   THIS INFORMATION WAS SENT TO TRAUMA PA.
Pt doing very well with no sitter. Referral has been sent to [facility] and accepted. Awaiting precert. Son updated. Pt's son has called this LSW multiple times today with demands and complaints. He does not want the pt to be transferred today as it is not convenient for family. He now insists that the pt will need a private room and he wants a new facility. This LSW explained to son that we cannot find a new place for pt and delay the DC as she has been ready for DC for days now. LSW explained to son that he can choose to move the pt if he is not happy with the placement once she gets to [facility]. Son is unhappy with this LSW.
SHAW SPOKE WITH THE PT'S NIECE AND HER SON TO EXPLAIN THE DIFFICULTY THAT WE WILL HAVE WITH FINDING AN APPROPRIATE REHAB FOR PT AS SHE HAS DEMENTIA AND IS TTWB. WE DISCUSSED OPTIONS AND THEY WILL REVIEW SNF CHOICES. WE DISCUSSED PRIVATE HIRE CAREGIVERS TEMPORARILY UNTIL SHE IS ABLE TO AMBULATE WITH FULL WEIGHT BARING. FAMILY IS OPEN TO LOOKING AT THAT OPTION. LSW TO FOLLOW FOR SNF PLACEMENT. Shaw met with pt and her niece at bedside and a snf list was provided for their review. Awaiting choices for snf and then precert will be needed.
Yes  Other Identified Issues/Barriers to RETURNING to current housing: will likely need rehab  Potential Assistance needed at discharge: (P) Other (Comment) (pt was given information on hhc/rehab/snf services)            Potential DME:    Patient expects to discharge to: (P) Other (comment) (will need assessed post operatively)  Plan for transportation at discharge:      Financial    Payor: Emily Cope / Plan: Salima Calderon PPO / Product Type: Medicare /     Does insurance require precert for SNF: Yes    Potential assistance Purchasing Medications: (P) No  Meds-to-Beds request:        [address] [place] [facility], [address] [facility],[address] [facility]  [address] [facility],  Phone: [phone] Fax: [phone]      Notes:    Factors facilitating achievement of predicted outcomes: Family support, Motivated, Cooperative, Pleasant, Sense of humor, and Good insight into deficits    Barriers to discharge: possible rehab    Additional Case Management Notes: pt has information on hhc/rehab/snf. Is for possible surgery. She states she has a built in shower seat with grab bars. She denies being on home O2 or dialysis. The Plan for Transition of Care is related to the following treatment goals of T wave inversion in EKG [R94.31]  Closed fracture of right hip, initial encounter ([address]) [S72.001A]  Closed subcapital fracture of femur, right, initial encounter ([address]) [O74.879P]    IF APPLICABLE: The Patient and/or patient representative Allegra Cherry and her family were provided with a choice of provider and agrees with the discharge plan. Freedom of choice list with basic dialogue that supports the patient's individualized plan of care/goals and shares the quality data associated with the providers was provided to:     Patient Representative Name:       The Patient and/or Patient Representative Agree with the Discharge Plan?       Corbin Escobar RN  Case Management Department

## 2023-10-20 NOTE — PROGRESS NOTES
Physical Therapy Med Surg Daily Treatment Note  Facility/Department: [facility]  Room: E628/N267-10       NAME: Kiersten Morgan  : 11/3/1929 (80 y.o.)  MRN: [id number]  CODE STATUS: Full Code    Date of Service: 10/20/2023    Patient Diagnosis(es): T wave inversion in EKG [R94.31]  Closed fracture of right hip, initial encounter [facility]) [S72.001A]  Closed subcapital fracture of femur, right, initial encounter [facility]) [S72.011A]   Chief Complaint   Patient presents with    Fall     Rt hip pain      Patient Active Problem List    Diagnosis Date Noted    Closed subcapital fracture of femur, right, initial encounter ([address]) 10/13/2023        Past Medical History:   Diagnosis Date    Thyroid disease      Past Surgical History:   Procedure Laterality Date    FRACTURE SURGERY      R ankle     HIP SURGERY Right 10/14/2023    PERCUTANEOUS SCREW FIXATION RIGHT FEMORAL NECK performed by Renetta William DO at [address]            Restrictions:  Restrictions/Precautions: Weight Bearing; Fall Risk  Lower Extremity Weight Bearing Restrictions  Right Lower Extremity Weight Bearing: Toe Touch Weight Bearing    SUBJECTIVE:   Subjective: In need to call my sister. My mother might not know where I am    Pain  Pain: 0/10 pre and post.    OBJECTIVE:        Bed mobility  Supine to Sit: Supervision  Scooting: Supervision    Transfers  Sit to Stand: Stand by assistance  Stand to Sit: Minimal Assistance  Bed to Chair: Minimal assistance    Ambulation  WB Status: TTWB RLE  Ambulation  Surface: Level tile  Device: Rolling Walker  Assistance: Minimal assistance  Quality of Gait: Pt not following directions for compliance of WB restrictions and only complying 10% of the time.   Gait Deviations: Slow Aislinn;Decreased step length;Decreased step height  Distance: 6' with turns                                         ASSESSMENT   Body Structures, Functions, Activity Limitations Requiring Skilled Therapeutic Intervention: Decreased functional

## 2023-10-21 ENCOUNTER — OFFICE VISIT (OUTPATIENT)
Dept: GERIATRIC MEDICINE | Age: 88
End: 2023-10-21

## 2023-10-21 DIAGNOSIS — Z47.89 ORTHOPEDIC AFTERCARE: Primary | ICD-10-CM

## 2023-10-21 DIAGNOSIS — F03.90 DEMENTIA WITHOUT BEHAVIORAL DISTURBANCE (HCC): ICD-10-CM

## 2023-10-21 DIAGNOSIS — E03.9 HYPOTHYROIDISM, UNSPECIFIED TYPE: ICD-10-CM

## 2023-10-23 NOTE — PROGRESS NOTES
Physical Therapy  [facility] X146/Q038-91  Physical Therapy Discharge      NAME: Esperanza Barry    : 11/3/1929 (80 y.o.)  MRN: [id number]    Account: [de-identified]  Gender: female      Patient has been discharged from acute care hospital. DC patient from current PT program.      Electronically signed by Wally Snell PT on 10/23/23 at 8:23 AM EDT

## 2023-10-28 NOTE — PROGRESS NOTES
History and Physical      CHIEF COMPLAINT:  Fall with R femur fx s/p percutaneous screw    History of Present Illness:      A 80 y.o. female who is being seen at [facility] in the Dementia unit. The patient had a Fall which resulted in a Right femur neck fracture requiring Percutanous Screw fixation. The patient is sitting in the main area. She has baseline dementia. She was confused and trying to get up by herself earlier. REVIEW OF SYSTEMS:  A complete 10 Point review of systems was preformed and negative unless previously stated      PMH:  Past Medical History:   Diagnosis Date    Dementia ([address])     Thyroid disease        Surgical History:  Past Surgical History:   Procedure Laterality Date    FRACTURE SURGERY      R ankle     HIP SURGERY Right 10/14/2023    PERCUTANEOUS SCREW FIXATION RIGHT FEMORAL NECK performed by Gloria Crespo DO at [address]       Medications Prior to Admission:    Prior to Admission medications    Medication Sig Start Date End Date Taking? Authorizing Provider   melatonin 5 MG TBDP disintegrating tablet Take 1 tablet by mouth nightly for 15 days 10/20/23 11/4/23  Cheryle Speaker, PA-C   lidocaine 4 % external patch Place 1 patch onto the skin daily for 10 days 10/21/23 10/31/23  Cheryle Speaker, PA-C   acetaminophen (TYLENOL) 325 MG tablet Take 2 tablets by mouth every 6 hours for 15 days 10/20/23 11/4/23  Cheryle Speaker, PA-C   aspirin 81 MG EC tablet Take 1 tablet by mouth in the morning and at bedtime 10/20/23 12/1/23  Anabella Worrell PA-C   levothyroxine (SYNTHROID) 75 MCG tablet Take 75 mcg by mouth Daily    Provider, MD Manohar       Allergies:    Patient has no known allergies. Social History:    reports that she has quit smoking. She does not have any smokeless tobacco history on file. She reports that she does not drink alcohol and does not use drugs.     Family History:   Cardiac    PHYSICAL EXAM:      Vitals were reviewed and

## 2023-11-02 ENCOUNTER — ANCILLARY PROCEDURE (OUTPATIENT)
Dept: RADIOLOGY | Facility: CLINIC | Age: 88
End: 2023-11-02
Payer: MEDICARE

## 2023-11-02 ENCOUNTER — OFFICE VISIT (OUTPATIENT)
Dept: ORTHOPEDIC SURGERY | Facility: CLINIC | Age: 88
End: 2023-11-02
Payer: MEDICARE

## 2023-11-02 DIAGNOSIS — M84.353S: Primary | ICD-10-CM

## 2023-11-02 DIAGNOSIS — M84.353S: ICD-10-CM

## 2023-11-02 PROCEDURE — 73502 X-RAY EXAM HIP UNI 2-3 VIEWS: CPT | Mod: LT,FY

## 2023-11-02 PROCEDURE — 73502 X-RAY EXAM HIP UNI 2-3 VIEWS: CPT | Mod: RT,FY

## 2023-11-02 PROCEDURE — 73522 X-RAY EXAM HIPS BI 3-4 VIEWS: CPT | Mod: RIGHT SIDE | Performed by: ORTHOPAEDIC SURGERY

## 2023-11-02 PROCEDURE — 1159F MED LIST DOCD IN RCRD: CPT | Performed by: ORTHOPAEDIC SURGERY

## 2023-11-02 PROCEDURE — 99024 POSTOP FOLLOW-UP VISIT: CPT | Performed by: ORTHOPAEDIC SURGERY

## 2023-11-02 PROCEDURE — 1158F ADVNC CARE PLAN TLK DOCD: CPT | Performed by: ORTHOPAEDIC SURGERY

## 2023-11-02 RX ORDER — ASPIRIN 81 MG/1
81 TABLET ORAL 2 TIMES DAILY
COMMUNITY
Start: 2023-10-20 | End: 2023-12-01

## 2023-11-02 RX ORDER — ACETAMINOPHEN 325 MG/1
650 TABLET ORAL
COMMUNITY
Start: 2023-10-20 | End: 2023-11-04

## 2023-11-02 RX ORDER — CALCIUM CARB/MAGNESIUM CARB 311-232MG
1 TABLET ORAL NIGHTLY
COMMUNITY
Start: 2023-10-20 | End: 2023-11-04

## 2023-11-02 RX ORDER — LEVOTHYROXINE SODIUM 75 UG/1
75 TABLET ORAL DAILY
COMMUNITY
Start: 2018-01-10

## 2023-11-02 NOTE — PROGRESS NOTES
2023    Chief Complaint   Patient presents with    Right Hip - Post-op     Percutaneous screw fixation Rt femoral neck fx  DOS: 10/14/23  Xrays today        History of Present Illness:  Patient Anita L Litten , 89 y.o. female, presents today, 2023, for evaluation of right  hip femoral neck fracture that underwent percutaneous screw fixation, approximately 3 and half weeks postop.  Patient was discharged from hospital to skilled nursing facility.  She is here today with her daughter and aide.  They help provide and confirm history.  She has been designated as toe-touch weightbearing since the time of her surgery and has been in adherent to these restrictions.  Overall doing well.  Minimal soreness discomfort to the hip.  Things continue to improve.       Review of Systems:   GENERAL: Negative  GI: Negative  MUSCULOSKELETAL: See HPI  SKIN: Negative  NEURO:  Negative     Physical Exam:  GENERAL:  Alert and oriented to person, place, and time.  No acute distress and breathing comfortably; pleasant and cooperative with the examination.  HEENT:  Head is normocephalic and atraumatic.  NECK:  Supple, no visible swelling.  CARDIOVASCULAR:  No palpable tachycardia.  LUNGS:  No audible wheezing or labored breathing.  ABDOMEN:  Nondistended.  Extremities: The surgical incision is clean, dry, intact, and appears to be healing well.  No active bleeding, erythema, warmth, drainage, or signs of infection.  Appropriate functional ROM demonstrated with flexion extension maintained at the hip.  Minimal pain with internal/external rotation with logroll.     Imagin views of the right hip taken in the [place] office today show evidence of femoral neck fracture, nondisplaced.  Continued good alignment throughout both planes.  No evidence of hardware failure or migration.     Assessment:  Right femoral neck fracture, nondisplaced 3 and half weeks out from percutaneous screw fixation.     Plan:  Recommendations were  made for staple removal.  This performed office today and tolerated by patient.  Continue with toe-touch weightbearing for 2 weeks, at that point she can progress to weight-bear to tolerance.  Follow-up in 4 weeks for repeat clinical exam and radiographic exam, 2 views of the right hip upon return.  All questions answered at today's visit.  Written instructions provided for skilled nursing facility.    Louise Murillo PA-C

## 2023-11-18 ENCOUNTER — OFFICE VISIT (OUTPATIENT)
Dept: GERIATRIC MEDICINE | Age: 88
End: 2023-11-18
Payer: MEDICARE

## 2023-11-18 DIAGNOSIS — E03.9 HYPOTHYROIDISM, UNSPECIFIED TYPE: ICD-10-CM

## 2023-11-18 DIAGNOSIS — F03.90 DEMENTIA WITHOUT BEHAVIORAL DISTURBANCE (HCC): ICD-10-CM

## 2023-11-18 DIAGNOSIS — Z47.89 ORTHOPEDIC AFTERCARE: Primary | ICD-10-CM

## 2023-11-18 PROCEDURE — 99309 SBSQ NF CARE MODERATE MDM 30: CPT | Performed by: INTERNAL MEDICINE

## 2023-11-18 PROCEDURE — 1123F ACP DISCUSS/DSCN MKR DOCD: CPT | Performed by: INTERNAL MEDICINE

## 2023-11-21 NOTE — PROGRESS NOTES
SNF PROGRESS NOTE      Cc- Fall with R femur fx s/p percutaneous screw      Patient is a Darien Guidry 80 y.o. female who is being seen at [facility] s/p fall with femur fx s/p percutanous Screw. She is being seen in the dementia unit. The patient is sitting in the main area. Unfortunately, she tested positive for COVID this am.   Over the last 30 days, the patient sustained a fall, and the patient had no injuries. Past Medical History:   Diagnosis Date    Dementia [facility])     Thyroid disease      Patient has no known allergies.     VS reviewed    Gen- Alert and oriented x 1  Heart- RRR no murmur no LE edema   Lungs- CTA b/l no resp distress RA oxygen   Abd- bs x 4           Assessment and Plan    S/p Right femur fracture s/p perc screw fixation   Surgery 10/14  PT OT   Dementia   COVID   Isolation   Decadron   Molnupiravir  Hypothyroid  Synthroid        Sonu Velasco DO [facility]     Electronically signed by: Troy Garsia DO on 11/18/2023

## 2023-11-28 ENCOUNTER — OFFICE VISIT (OUTPATIENT)
Dept: GERIATRIC MEDICINE | Age: 88
End: 2023-11-28
Payer: MEDICARE

## 2023-11-28 DIAGNOSIS — F03.90 DEMENTIA WITHOUT BEHAVIORAL DISTURBANCE (HCC): ICD-10-CM

## 2023-11-28 DIAGNOSIS — E03.9 HYPOTHYROIDISM, UNSPECIFIED TYPE: ICD-10-CM

## 2023-11-28 DIAGNOSIS — Z47.89 ORTHOPEDIC AFTERCARE: Primary | ICD-10-CM

## 2023-11-28 PROCEDURE — 99308 SBSQ NF CARE LOW MDM 20: CPT | Performed by: INTERNAL MEDICINE

## 2023-11-28 PROCEDURE — 1123F ACP DISCUSS/DSCN MKR DOCD: CPT | Performed by: INTERNAL MEDICINE

## 2023-11-30 NOTE — PROGRESS NOTES
SNF PROGRESS NOTE      Cc- Fall with R femur fx s/p percutaneous screw      Patient is a Tiff Contreras 80 y.o. female who is being seen at [facility] s/p fall with femur fx s/p percutanous Screw. She is being seen in the dementia unit. Patient is more weight bearing. She is sitting in the chair and denies any complaints of pain. Appeal/ Peer-peer was made and granted. Past Medical History:   Diagnosis Date    Dementia [facility])     Thyroid disease      Patient has no known allergies.     VS reviewed      Gen- Alert and oriented x 1  Heart- RRR no murmur no LE edema   Lungs- CTA b/l no resp distress RA oxygen   Abd- bs x 4         Assessment and Plan    S/p Right femur fracture s/p perc screw fixation   Surgery 10/14  PT OT   Dementia   Hypothyroid  Synthroid       Neisha Fernandez DO, [facility]     Electronically signed by: Katelyn Harrison DO on 11/28/2023

## 2023-12-07 ENCOUNTER — OFFICE VISIT (OUTPATIENT)
Dept: ORTHOPEDIC SURGERY | Facility: CLINIC | Age: 88
End: 2023-12-07
Payer: MEDICARE

## 2023-12-07 ENCOUNTER — ANCILLARY PROCEDURE (OUTPATIENT)
Dept: RADIOLOGY | Facility: CLINIC | Age: 88
End: 2023-12-07
Payer: MEDICARE

## 2023-12-07 DIAGNOSIS — M84.353S: ICD-10-CM

## 2023-12-07 PROCEDURE — 1159F MED LIST DOCD IN RCRD: CPT | Performed by: ORTHOPAEDIC SURGERY

## 2023-12-07 PROCEDURE — 73502 X-RAY EXAM HIP UNI 2-3 VIEWS: CPT | Mod: RIGHT SIDE | Performed by: ORTHOPAEDIC SURGERY

## 2023-12-07 PROCEDURE — 99024 POSTOP FOLLOW-UP VISIT: CPT | Performed by: ORTHOPAEDIC SURGERY

## 2023-12-07 PROCEDURE — 1158F ADVNC CARE PLAN TLK DOCD: CPT | Performed by: ORTHOPAEDIC SURGERY

## 2023-12-07 PROCEDURE — 73502 X-RAY EXAM HIP UNI 2-3 VIEWS: CPT | Mod: RT,FY

## 2023-12-07 NOTE — PROGRESS NOTES
History present illness: Patient presents today for evaluation status post percutaneous screw fixation nondisplaced right femoral neck fracture.  She is not having any pain.  No tenderness palpation to the        Physical examination:  General: Alert and oriented to person, place, and time.  No acute distress and breathing comfortably: Pleasant and cooperative with examination.  Extremities: Right hip.  No pain with internal/external rotation.  Intact to sensory and motor distally to right lower extremity      Diagnostic studies: X-rays of the right hip demonstrate healed femoral neck fracture      Assessment: Healed right femoral neck fracture      Plan: Recommendations were made for progression to weightbearing to tolerance and to follow-up with me on an as-needed basis.  She can work with therapy balance gait training range of motion strengthening etc.      Procedure:        Procedure:

## 2023-12-27 ENCOUNTER — OFFICE VISIT (OUTPATIENT)
Dept: GERIATRIC MEDICINE | Age: 88
End: 2023-12-27
Payer: MEDICARE

## 2023-12-27 DIAGNOSIS — F03.90 DEMENTIA WITHOUT BEHAVIORAL DISTURBANCE (HCC): ICD-10-CM

## 2023-12-27 DIAGNOSIS — Z47.89 ORTHOPEDIC AFTERCARE: Primary | ICD-10-CM

## 2023-12-27 DIAGNOSIS — E03.9 HYPOTHYROIDISM, UNSPECIFIED TYPE: ICD-10-CM

## 2023-12-27 PROCEDURE — 99316 NF DSCHRG MGMT 30 MIN+: CPT | Performed by: INTERNAL MEDICINE

## 2023-12-28 NOTE — PROGRESS NOTES
Discharge Summary       Discharge Disposition Assisted living     Discharge Condition stable       Discharge time 36 min       Medications   Current Outpatient Medications   Medication Sig Dispense Refill    melatonin 5 MG TBDP disintegrating tablet Take 1 tablet by mouth nightly for 15 days 15 tablet 0    acetaminophen (TYLENOL) 325 MG tablet Take 2 tablets by mouth every 6 hours for 15 days 120 tablet 0    aspirin 81 MG EC tablet Take 1 tablet by mouth in the morning and at bedtime 84 tablet 0    levothyroxine (SYNTHROID) 75 MCG tablet Take 75 mcg by mouth Daily       No current facility-administered medications for this visit. Diet- regular     Activity as tolerated         Brief SNF Course--     This is an 80 y.o. female who was seen at [facility] post fall with femur fx and percutaneous screwing. The patient had PRN pain meds and worked with therapy. She was discharged to AL.            Discharge Diagnosis :  S/p Right femur fracture s/p perc screw fixation   Dementia   COVID   Hypothyroid          Follow up --     PCP 1-2 weeks         Christy Robertson DO, [facility]     Electronically signed by: Nelson Bustamante DO on 12/27/2023

## 2023-12-29 ENCOUNTER — CARE COORDINATION (OUTPATIENT)
Dept: CARE COORDINATION | Age: 88
End: 2023-12-29

## 2023-12-29 ENCOUNTER — CARE COORDINATION (OUTPATIENT)
Dept: CASE MANAGEMENT | Age: 88
End: 2023-12-29

## 2023-12-29 NOTE — CARE COORDINATION
Contacted [facility] at [place] to request updated medication list and a voicemail was left requesting a call back.

## 2023-12-29 NOTE — CARE COORDINATION
Care Transitions Post-Acute Facility Update Call    2023    Patient: Samuel Chowdhury Patient : 11/3/1929   MRN: <[id number]>  Reason for Admission:   Discharge Date: 10/20/23 RARS: Readmission Risk Score: 9.7    Per Patientping patient discharged from [facility] on 23 to a assisted living. Request to  obtain location and a updated med list. If list received it will be scanned into media.

## 2024-01-02 NOTE — CARE COORDINATION
Contacted Napoleon at [place] to request updated medication list and a voicemail was left requesting a call back.

## 2024-01-03 ENCOUNTER — CARE COORDINATION (OUTPATIENT)
Dept: CASE MANAGEMENT | Age: 89
End: 2024-01-03

## 2024-01-03 DIAGNOSIS — S72.011A CLOSED SUBCAPITAL FRACTURE OF FEMUR, RIGHT, INITIAL ENCOUNTER (HCC): Primary | ICD-10-CM

## 2024-01-03 PROCEDURE — 1111F DSCHRG MED/CURRENT MED MERGE: CPT | Performed by: INTERNAL MEDICINE

## 2024-01-03 NOTE — CARE COORDINATION
Care Transitions Post-Acute Facility Update Call    1/3/2024    Patient: Anita Y Litten Patient : 11/3/1929   MRN: <[id number]>  Reason for Admission:   Discharge Date: 10/20/23 RARS: Readmission Risk Score: 9.7    Did not receive a updated med list from the [facility].

## 2025-06-22 ENCOUNTER — APPOINTMENT (OUTPATIENT)
Dept: RADIOLOGY | Facility: HOSPITAL | Age: OVER 89
End: 2025-06-22
Payer: MEDICARE

## 2025-06-22 ENCOUNTER — APPOINTMENT (OUTPATIENT)
Dept: CARDIOLOGY | Facility: HOSPITAL | Age: OVER 89
End: 2025-06-22
Payer: MEDICARE

## 2025-06-22 ENCOUNTER — HOSPITAL ENCOUNTER (EMERGENCY)
Facility: HOSPITAL | Age: OVER 89
Discharge: HOME | End: 2025-06-22
Attending: EMERGENCY MEDICINE
Payer: MEDICARE

## 2025-06-22 ENCOUNTER — HOSPITAL ENCOUNTER (EMERGENCY)
Facility: HOSPITAL | Age: OVER 89
Discharge: NURSING, REHAB OR LONG-TERM CARE | End: 2025-06-23
Attending: STUDENT IN AN ORGANIZED HEALTH CARE EDUCATION/TRAINING PROGRAM
Payer: MEDICARE

## 2025-06-22 VITALS
WEIGHT: 150 LBS | SYSTOLIC BLOOD PRESSURE: 166 MMHG | TEMPERATURE: 97.9 F | OXYGEN SATURATION: 98 % | RESPIRATION RATE: 17 BRPM | HEIGHT: 64 IN | HEART RATE: 64 BPM | DIASTOLIC BLOOD PRESSURE: 86 MMHG | BODY MASS INDEX: 25.61 KG/M2

## 2025-06-22 DIAGNOSIS — R04.0 EPISTAXIS: Primary | ICD-10-CM

## 2025-06-22 DIAGNOSIS — S06.6X0A SUBARACHNOID HEMATOMA, WITHOUT LOSS OF CONSCIOUSNESS, INITIAL ENCOUNTER (MULTI): Primary | ICD-10-CM

## 2025-06-22 DIAGNOSIS — R41.0 CONFUSION: ICD-10-CM

## 2025-06-22 DIAGNOSIS — S01.01XA LACERATION OF SCALP, INITIAL ENCOUNTER: ICD-10-CM

## 2025-06-22 LAB
ANION GAP SERPL CALC-SCNC: 12 MMOL/L (ref 10–20)
BASOPHILS # BLD AUTO: 0.04 X10*3/UL (ref 0–0.1)
BASOPHILS NFR BLD AUTO: 0.4 %
BUN SERPL-MCNC: 15 MG/DL (ref 6–23)
CALCIUM SERPL-MCNC: 8.9 MG/DL (ref 8.6–10.3)
CARDIAC TROPONIN I PNL SERPL HS: 12 NG/L (ref 0–13)
CHLORIDE SERPL-SCNC: 107 MMOL/L (ref 98–107)
CO2 SERPL-SCNC: 26 MMOL/L (ref 21–32)
CREAT SERPL-MCNC: 1.04 MG/DL (ref 0.5–1.05)
EGFRCR SERPLBLD CKD-EPI 2021: 50 ML/MIN/1.73M*2
EOSINOPHIL # BLD AUTO: 0.04 X10*3/UL (ref 0–0.4)
EOSINOPHIL NFR BLD AUTO: 0.4 %
ERYTHROCYTE [DISTWIDTH] IN BLOOD BY AUTOMATED COUNT: 13.1 % (ref 11.5–14.5)
GLUCOSE SERPL-MCNC: 118 MG/DL (ref 74–99)
HCT VFR BLD AUTO: 45.7 % (ref 36–46)
HGB BLD-MCNC: 14.4 G/DL (ref 12–16)
IMM GRANULOCYTES # BLD AUTO: 0.02 X10*3/UL (ref 0–0.5)
IMM GRANULOCYTES NFR BLD AUTO: 0.2 % (ref 0–0.9)
LYMPHOCYTES # BLD AUTO: 1.95 X10*3/UL (ref 0.8–3)
LYMPHOCYTES NFR BLD AUTO: 21 %
MAGNESIUM SERPL-MCNC: 1.89 MG/DL (ref 1.6–2.4)
MCH RBC QN AUTO: 29 PG (ref 26–34)
MCHC RBC AUTO-ENTMCNC: 31.5 G/DL (ref 32–36)
MCV RBC AUTO: 92 FL (ref 80–100)
MONOCYTES # BLD AUTO: 0.71 X10*3/UL (ref 0.05–0.8)
MONOCYTES NFR BLD AUTO: 7.6 %
NEUTROPHILS # BLD AUTO: 6.53 X10*3/UL (ref 1.6–5.5)
NEUTROPHILS NFR BLD AUTO: 70.4 %
NRBC BLD-RTO: 0 /100 WBCS (ref 0–0)
PLATELET # BLD AUTO: 278 X10*3/UL (ref 150–450)
POTASSIUM SERPL-SCNC: 3.9 MMOL/L (ref 3.5–5.3)
RBC # BLD AUTO: 4.96 X10*6/UL (ref 4–5.2)
SODIUM SERPL-SCNC: 141 MMOL/L (ref 136–145)
WBC # BLD AUTO: 9.3 X10*3/UL (ref 4.4–11.3)

## 2025-06-22 PROCEDURE — 70450 CT HEAD/BRAIN W/O DYE: CPT

## 2025-06-22 PROCEDURE — 80048 BASIC METABOLIC PNL TOTAL CA: CPT | Performed by: STUDENT IN AN ORGANIZED HEALTH CARE EDUCATION/TRAINING PROGRAM

## 2025-06-22 PROCEDURE — 90471 IMMUNIZATION ADMIN: CPT | Performed by: EMERGENCY MEDICINE

## 2025-06-22 PROCEDURE — 93005 ELECTROCARDIOGRAM TRACING: CPT

## 2025-06-22 PROCEDURE — 84484 ASSAY OF TROPONIN QUANT: CPT | Performed by: STUDENT IN AN ORGANIZED HEALTH CARE EDUCATION/TRAINING PROGRAM

## 2025-06-22 PROCEDURE — 99285 EMERGENCY DEPT VISIT HI MDM: CPT | Mod: 25

## 2025-06-22 PROCEDURE — 90715 TDAP VACCINE 7 YRS/> IM: CPT | Performed by: EMERGENCY MEDICINE

## 2025-06-22 PROCEDURE — 72125 CT NECK SPINE W/O DYE: CPT | Performed by: STUDENT IN AN ORGANIZED HEALTH CARE EDUCATION/TRAINING PROGRAM

## 2025-06-22 PROCEDURE — 2500000001 HC RX 250 WO HCPCS SELF ADMINISTERED DRUGS (ALT 637 FOR MEDICARE OP): Performed by: EMERGENCY MEDICINE

## 2025-06-22 PROCEDURE — 70450 CT HEAD/BRAIN W/O DYE: CPT | Performed by: STUDENT IN AN ORGANIZED HEALTH CARE EDUCATION/TRAINING PROGRAM

## 2025-06-22 PROCEDURE — 70450 CT HEAD/BRAIN W/O DYE: CPT | Performed by: RADIOLOGY

## 2025-06-22 PROCEDURE — 36415 COLL VENOUS BLD VENIPUNCTURE: CPT | Performed by: STUDENT IN AN ORGANIZED HEALTH CARE EDUCATION/TRAINING PROGRAM

## 2025-06-22 PROCEDURE — 72125 CT NECK SPINE W/O DYE: CPT

## 2025-06-22 PROCEDURE — 85025 COMPLETE CBC W/AUTO DIFF WBC: CPT | Performed by: STUDENT IN AN ORGANIZED HEALTH CARE EDUCATION/TRAINING PROGRAM

## 2025-06-22 PROCEDURE — 2500000004 HC RX 250 GENERAL PHARMACY W/ HCPCS (ALT 636 FOR OP/ED): Performed by: EMERGENCY MEDICINE

## 2025-06-22 PROCEDURE — 83735 ASSAY OF MAGNESIUM: CPT | Performed by: STUDENT IN AN ORGANIZED HEALTH CARE EDUCATION/TRAINING PROGRAM

## 2025-06-22 PROCEDURE — 99291 CRITICAL CARE FIRST HOUR: CPT | Performed by: EMERGENCY MEDICINE

## 2025-06-22 PROCEDURE — 99284 EMERGENCY DEPT VISIT MOD MDM: CPT | Mod: 25 | Performed by: EMERGENCY MEDICINE

## 2025-06-22 PROCEDURE — 51798 US URINE CAPACITY MEASURE: CPT

## 2025-06-22 PROCEDURE — 99285 EMERGENCY DEPT VISIT HI MDM: CPT | Mod: 25 | Performed by: STUDENT IN AN ORGANIZED HEALTH CARE EDUCATION/TRAINING PROGRAM

## 2025-06-22 RX ORDER — ACETAMINOPHEN 325 MG/1
975 TABLET ORAL ONCE
Status: COMPLETED | OUTPATIENT
Start: 2025-06-22 | End: 2025-06-22

## 2025-06-22 RX ORDER — LABETALOL HYDROCHLORIDE 5 MG/ML
10 INJECTION, SOLUTION INTRAVENOUS ONCE
Status: DISCONTINUED | OUTPATIENT
Start: 2025-06-22 | End: 2025-06-22

## 2025-06-22 RX ADMIN — TETANUS TOXOID, REDUCED DIPHTHERIA TOXOID AND ACELLULAR PERTUSSIS VACCINE, ADSORBED 0.5 ML: 5; 2.5; 8; 8; 2.5 SUSPENSION INTRAMUSCULAR at 06:22

## 2025-06-22 RX ADMIN — Medication 1 APPLICATION: at 09:31

## 2025-06-22 RX ADMIN — ACETAMINOPHEN 975 MG: 325 TABLET ORAL at 01:42

## 2025-06-22 ASSESSMENT — PAIN SCALES - GENERAL
PAINLEVEL_OUTOF10: 6
PAINLEVEL_OUTOF10: 0 - NO PAIN

## 2025-06-22 ASSESSMENT — PAIN DESCRIPTION - LOCATION: LOCATION: HEAD

## 2025-06-22 ASSESSMENT — PAIN - FUNCTIONAL ASSESSMENT
PAIN_FUNCTIONAL_ASSESSMENT: 0-10

## 2025-06-22 ASSESSMENT — PAIN DESCRIPTION - PROGRESSION: CLINICAL_PROGRESSION: GRADUALLY IMPROVING

## 2025-06-22 NOTE — PROGRESS NOTES
Patient is a 89-year-old female that presented to the emergency room after a fall with head trauma.  She was initially seen and evaluated by Dr. Moore and signed out to me pending repeat CT imaging after initial CT showed trace subarachnoid hemorrhage with plans on repeat CT at 8:30 AM and reevaluation.  Please see his initial physician note for details.    Patient has remained at her neurologic baseline while in the ED.  She has no complaints.  Repeat CT is stable and discussed with neurosurgery, Dr. Lao via [employer] chat.  Who feels that she can be discharged home at this time without further workup.    Initial report was that patient had a laceration that required repair.  On reevaluation after patient was cleansed at bedside and upon  she appeared to have only a small superficial abrasion that achieved hemostasis after pressure was applied.  No open laceration is identified.  No acute need for laceration is identified at this time.  She is discharged in stable condition and will follow-up with her primary care physician as an outpatient.    Codie Prakash MD

## 2025-06-22 NOTE — ED TRIAGE NOTES
Pt arrived to ED via EMS from snf for a fall and head laceration. Per ems, pt was found on the floor- unsure how she got there. Pt has 2 inch laceration on back of head. Pt A&Ox1. Pt hard of hearing. Pt not complaining of pain.

## 2025-06-22 NOTE — ED PROVIDER NOTES
[facility] ED Note    Date of Service: 6/22/2025  Reason for Visit: Epistaxis (Nose Bleed)      Patient History     HPI  Anita L Litten is a 89 y.o. female with a past medical history of dementia who presents to the emergency department from her nursing facility for a nosebleed x 2.  Patient was seen earlier today after a fall, diagnosed with a subarachnoid hemorrhage, had a stable repeat CT head and after discussion with neurosurgery, given that she was at her baseline mental status, was seen appropriate for discharge and subsequently sent back to her nursing facility.  Patient has a history of dementia, is alert and orient x 1 at baseline, therefore unable to obtain further history at this time.    Per nursing facility: Patient had a nosebleed x 2 which concerned them and they thought she also was acting little confused and given her recent subarachnoid hemorrhage, they called 911.  They said the patient was trying to get out of the facility, Mumbling about she had to get to her friend's house which is unlike her and caused them concern.     Medical History[1]  Surgical History[2]      Physical Exam     Vitals:    06/22/25 2115 06/22/25 2130 06/22/25 2145 06/22/25 2200   BP:  (!) 127/110     Pulse:       Resp:       Temp:       TempSrc:       SpO2: 96% 100% 97% 96%   Weight:       Height:         General: Well-appearing, age-appropriate female, nontoxic, sitting comfortably in the gurney no acute distress.  Head: Hematoma on the occiput with a superficial abrasion  Eyes:  PERRL. EOMI. No discharge from eyes   Pulmonary:   Non-labored breathing. Breath sounds clear bilaterally  Cardiac:  Regular rate   Abdomen:  Soft. Non-tender. Non-distended  Musculoskeletal:  No long bone deformity. No peripheral edema   Skin:  Dry, no rashes  Neuro: Alert and orient x 1.  GCS 14.  Moves all 4 extremity spontaneously.    Diagnostic Studies     Labs:  Please see EMR for labs obtained during this patient  encounter.    Radiology:  Please see EMR for imaging obtained during this patient encounter.    EKG:  Normal sinus rhythm, ventricular rate of 93.  Left axis deviation.  Normal [place] interval of 198.  Normal ventricular conduction with a QRS ration of 76 and a QTc interval of 437.  No Q waves appreciated, normal ST segments, T wave flattening seen in the limb leads.  Unchanged from EKG obtained earlier today.      ED Course and MDM     Anita L Litten is a 89 y.o. female with a history and presentation as described above in HPI.      Upon presentation, the patient was afebrile, well-appearing, with unremarkable vital signs.  Patient presented to the emergency department for concerns for epistaxis from her nursing facility as well as some slight confusion.  Differential diagnosis includes worsening of her known subarachnoid hemorrhage, possible UTI, possible electro abnormality, for possible atypical ACS.  Patient did not have any bleeding of her nose on my exam.  Therefore no further workup or treatment was given for her epistaxis given spontaneous resolution.  Patient had an unremarkable laboratory evaluation.  BMP without any electrolyte abnormalities with a creatinine at baseline.  Patient's EKG does not show any signs of ischemia arrhythmia with a negative troponin.  Patient's CBC is normal.  We did repeat patient's CT head that did not show any change in her subarachnoid hemorrhage.  We did attempt to get urine, however patient does not have any urine in her bladder and given that patient is back to her baseline mental status, I have low suspicion for UTI precipitating patient's symptoms today.  Patient does not have any ongoing confusion and therefore is appropriate for discharge back to her living facility.  I did discuss return precautions in her instructions to her living facility and she will be discharged back to her facility at this time given reassuring evaluation.      Impression     Diagnoses as of  06/22/25 2230   Epistaxis   Confusion        Plan       Discharge, see DCI provided      Ramírez Alvarez MD  [facility] Emergency Medicine           [1] No past medical history on file.  [2] No past surgical history on file.       Ramírez Alvarez MD  06/22/25 2230

## 2025-06-22 NOTE — CONSULTS
"Reason For Consult  Subarachnoid hemorrhage    History Of Present Illness  Anita L Litten is a 89 y.o. female with a history of hypothyroidism, dementia, SNF resident who presents after an unwitnessed fall. Patient had a scalp laceration, CT head showed traumatic subarachnoid hemorrhage for which neurosurgery is consulted. Patient is aware of her scalp laceration but otherwise feels well. She is reportedly Ox1 at baseline.     Past Medical History  She has no past medical history on file.    Surgical History  She has no past surgical history on file.     Social History  She has no history on file for tobacco use, alcohol use, and drug use.    Family History  Family History[1]     Allergies  Niacin-lovastatin     Physical Exam  Physical Exam  HENT:      Head: Normocephalic.      Comments: Left occipital scalp laceration without active bleeding     Mouth/Throat:      Mouth: Mucous membranes are moist.      Pharynx: Oropharynx is clear.   Eyes:      Conjunctiva/sclera: Conjunctivae normal.      Pupils: Pupils are equal, round, and reactive to light.   Pulmonary:      Effort: Pulmonary effort is normal.   Abdominal:      General: Abdomen is flat.      Palpations: Abdomen is soft.   Skin:     General: Skin is warm and dry.   Neurological:      Mental Status: She is alert.      Comments: Hard of hearing  Ox2  BUE follows commands symmetrically, antigravity  BLE follows commands symmetrically, antigravity             Last Recorded Vitals  Blood pressure 139/70, pulse 65, temperature 36.6 °C (97.9 °F), temperature source Temporal, resp. rate 17, height 1.626 m (5' 4\"), weight 68 kg (150 lb), SpO2 97%.    Relevant Results  CT head wo IV contrast  Result Date: 6/22/2025  Interpreted By:  Kwame Montero, STUDY: CT HEAD WO IV CONTRAST  6/22/2025 8:31 am   INDICATION: Signs/Symptoms:6 hour repeat trace SAH   COMPARISON: CT 6 hours prior.   ACCESSION NUMBER(S): [id number]   ORDERING CLINICIAN: KIMBERLEE MOE   TECHNIQUE: " Contiguous axial CT images of the brain were obtained without IV contrast.   FINDINGS: Trace posterior left frontal subarachnoid hemorrhage unchanged. Previous occipital hemorrhage is less conspicuous. The ventricles, cisterns and sulci are prominent, consistent with  severe diffuse volume loss. Areas of white matter low attenuation are nonspecific but likely related to chronic microvascular disease. There is intracranial atherosclerosis.   Gray-white differentiation is preserved. No mass effect. No midline shift. Patent basal cisterns. No extraaxial fluid collections.   The calvaria is intact. The visualized paranasal sinuses and mastoid air cells are clear.       Unchanged trace posterior left frontal subarachnoid hemorrhage compared to just over 6 hours prior. Previous left occipital hemorrhage is less conspicuous. No new sites of intracranial hemorrhage or new mass effect.     Signed by: Kwame Montero 6/22/2025 9:14 AM Dictation workstation:   [id number]    CT head wo IV contrast  Result Date: 6/22/2025  Interpreted By:  Jarad Simeon, STUDY: CT HEAD WO IV CONTRAST; CT CERVICAL SPINE WO IV CONTRAST;  6/22/2025 2:02 am   INDICATION: Signs/Symptoms:presumed fall posterior scalp laceration     COMPARISON: None.   ACCESSION NUMBER(S): [id number]; [id number]   ORDERING CLINICIAN: KIMBERLEE MOE   TECHNIQUE: Axial noncontrast CT images of head with coronal and sagittal reconstructed images. Axial noncontrast CT images of the cervical spine with coronal and sagittal reconstructed images.   FINDINGS: CT HEAD:   BRAIN PARENCHYMA: Chronic lacunar infarct in the ventral medial left thalamus. No acute intraparenchymal hemorrhage or parenchymal evidence of acute large territory ischemic infarct. Gray-white matter distinction is preserved. No mass-effect.   VENTRICLES and EXTRA-AXIAL SPACES: There is trace sulcal subarachnoid hemorrhage within the left precentral sulcus and additional trace sulcal subarachnoid  hemorrhage within left occipital lobe sulcus. No intraventricular hemorrhage. No subdural or epidural hematoma. No effacement of cerebral sulci. The ventricles and sulci are age-concordant.   PARANASAL SINUSES/MASTOIDS:  No hemorrhage or air-fluid levels within the visualized paranasal sinuses. The mastoids are well aerated.   CALVARIUM/ORBITS:  No acute skull fracture.  The orbits and globes are intact to the extent visualized.   EXTRACRANIAL SOFT TISSUES: No discernible acute abnormality.   Brain Injury Guidelines (BIG) CT Values:   Skull fracture: No SDH (subdural hematoma): No EDH (epidural hematoma): No IPH (intraparenchymal hemorrhage): No SAH (subarachnoid hemorrhage): Scattered IVH (intraventricular hemorrhage): No   Reference: Gael HAILE, Daniel RS, Oliverio M, et al. The BIG (brain injury guidelines) project: defining the management of traumatic brain injury by acute care surgeons. J Trauma Acute Care Surg 2014; 76:965-969.     CT CERVICAL SPINE:   PREVERTEBRAL SOFT TISSUES: Within normal limits.   CRANIOCERVICAL JUNCTION: Intact. There is thickening and calcification of the transverse ligament due to CPPD arthropathy result in mild canal stenosis.   ALIGNMENT:  No traumatic malalignment or traumatic facet widening.   VERTEBRAE: Osteopenic bone. No acute fracture. Vertebral body heights are maintained.   SPINAL CANAL/INTERVERTEBRAL DISCS: No critical canal stenosis. There is severe is C4-C5 disc height loss with disc spur complex resulting in mild canal stenosis. There is a disc spur complex and ligamentum flavum thickening at C5-C6 resulting in mild canal stenosis.   NEURAL FORAMINA: Multilevel uncovertebral joint and facet arthropathy notably contribute to mild right and moderate left C4-C5 foraminal stenosis, mild left C5-C6 foraminal stenosis, moderate C6-C7 foraminal stenosis.   OTHER: There is a 3.7 cm X 1 cm partially calcified left thyroid lobe nodule resulting in substernal extension.       CT HEAD:  1. Trace local subarachnoid hemorrhage within left precentral sulcus and left occipital lobe. 2. No acute calvarial fracture.     CT CERVICAL SPINE: 1. No acute fracture or traumatic malalignment of the cervical spine. 2. Spondylotic changes of the cervical spine as detailed above.   MACRO: Jarad Simeon discussed the significance and urgency of this critical finding by EPIC secure chat with  KIMBERLEE MOE on 6/22/2025 at 2:40 am.  (**-RCF-**) Findings:  See findings.   Signed by: Jarad Simeon 6/22/2025 2:40 AM Dictation workstation:   [id number]    CT cervical spine wo IV contrast  Result Date: 6/22/2025  Interpreted By:  Jarad Simeon, STUDY: CT HEAD WO IV CONTRAST; CT CERVICAL SPINE WO IV CONTRAST;  6/22/2025 2:02 am   INDICATION: Signs/Symptoms:presumed fall posterior scalp laceration     COMPARISON: None.   ACCESSION NUMBER(S): [id number]; [id number]   ORDERING CLINICIAN: KIMBERLEE MOE   TECHNIQUE: Axial noncontrast CT images of head with coronal and sagittal reconstructed images. Axial noncontrast CT images of the cervical spine with coronal and sagittal reconstructed images.   FINDINGS: CT HEAD:   BRAIN PARENCHYMA: Chronic lacunar infarct in the ventral medial left thalamus. No acute intraparenchymal hemorrhage or parenchymal evidence of acute large territory ischemic infarct. Gray-white matter distinction is preserved. No mass-effect.   VENTRICLES and EXTRA-AXIAL SPACES: There is trace sulcal subarachnoid hemorrhage within the left precentral sulcus and additional trace sulcal subarachnoid hemorrhage within left occipital lobe sulcus. No intraventricular hemorrhage. No subdural or epidural hematoma. No effacement of cerebral sulci. The ventricles and sulci are age-concordant.   PARANASAL SINUSES/MASTOIDS:  No hemorrhage or air-fluid levels within the visualized paranasal sinuses. The mastoids are well aerated.   CALVARIUM/ORBITS:  No acute skull fracture.  The orbits and globes are intact to  the extent visualized.   EXTRACRANIAL SOFT TISSUES: No discernible acute abnormality.   Brain Injury Guidelines (BIG) CT Values:   Skull fracture: No SDH (subdural hematoma): No EDH (epidural hematoma): No IPH (intraparenchymal hemorrhage): No SAH (subarachnoid hemorrhage): Scattered IVH (intraventricular hemorrhage): No   Reference: Gael HAILE, Daniel RS, Oliverio M, et al. The BIG (brain injury guidelines) project: defining the management of traumatic brain injury by acute care surgeons. J Trauma Acute Care Surg 2014; 76:965-969.     CT CERVICAL SPINE:   PREVERTEBRAL SOFT TISSUES: Within normal limits.   CRANIOCERVICAL JUNCTION: Intact. There is thickening and calcification of the transverse ligament due to CPPD arthropathy result in mild canal stenosis.   ALIGNMENT:  No traumatic malalignment or traumatic facet widening.   VERTEBRAE: Osteopenic bone. No acute fracture. Vertebral body heights are maintained.   SPINAL CANAL/INTERVERTEBRAL DISCS: No critical canal stenosis. There is severe is C4-C5 disc height loss with disc spur complex resulting in mild canal stenosis. There is a disc spur complex and ligamentum flavum thickening at C5-C6 resulting in mild canal stenosis.   NEURAL FORAMINA: Multilevel uncovertebral joint and facet arthropathy notably contribute to mild right and moderate left C4-C5 foraminal stenosis, mild left C5-C6 foraminal stenosis, moderate C6-C7 foraminal stenosis.   OTHER: There is a 3.7 cm X 1 cm partially calcified left thyroid lobe nodule resulting in substernal extension.       CT HEAD: 1. Trace local subarachnoid hemorrhage within left precentral sulcus and left occipital lobe. 2. No acute calvarial fracture.     CT CERVICAL SPINE: 1. No acute fracture or traumatic malalignment of the cervical spine. 2. Spondylotic changes of the cervical spine as detailed above.   MACRO: Jarad Simeon discussed the significance and urgency of this critical finding by EPIC secure chat with  KIMBERLEE  COVER on 6/22/2025 at 2:40 am.  (**-RCF-**) Findings:  See findings.   Signed by: Jarad Simeon 6/22/2025 2:40 AM Dictation workstation:   [id number]        Assessment/Plan     Anita L Litten is a 89 y.o. female with a history of hypothyroidism, dementia, SNF resident who presents after an unwitnessed fall. Patient had a scalp laceration, CT head showed traumatic subarachnoid hemorrhage for which neurosurgery is consulted.     Repeat imaging reviewed showing stable left convexity single sulcus subarachnoid hemorrhage. Due to evidence of head strike, this is consistent with traumatic subarachnoid hemorrhage. No AC/AP medications. No need for additional neuroimaging at this time. Recommend normotension. Okay for dispo from neurosurgery standpoint and no need for scheduled follow-up with neurosurgery.        Kanika Lao MD         [1] No family history on file.

## 2025-06-22 NOTE — ED PROVIDER NOTES
"Chief Complaint   Patient presents with    Head Laceration     History of Present Illness   Anita L Litten   MRN [id number]    89-year-old presents by EMS from skilled nursing facility after reported fall and head laceration.  Per EMS report patient was found on the floor after presumed fall however not witnessed.  Patient not able to contribute meaningfully to history of present illness due to history of dementia.  Patient alert and oriented x 1 which is reportedly her baseline and hard of hearing.  Patient without any specific complaints other than \"I am cold\".    I reviewed most recent discharge summary from 10/28/2023.  Chronic medical conditions including hypothyroidism, dementia, previous right femoral neck fracture.    Physical Exam   T    HR 83  BP (!) 137/101  RR 16  O2 96 % None (Room air)    General:  No acute distress.  Head: 3 cm superficial linear posterior scalp laceration without active bleeding.  No foreign bodies.  Eyes: No conjunctival injection.   Ears Nose Throat: No epistaxis. Oral mucosa moist.  Neck: Supple.  Cardiovascular: Extremities warm.   Pulmonary: No stridor. Normal respiratory effort.  Abdominal: No distention.  Musculoskeletal: No deformity or joint swelling.  Skin: No rash.  Psychiatric: Does not appear to respond to internal stimuli.  Neurologic: Alert. Follows directions. Face symmetric. No aphasia or dysarthria.  Strength intact upper and lower extremities without drift.  Sensation light touch intact.    ED Course & Medical Decision Making   Patient arrived after presumed fall with posterior scalp laceration.  Appears to be at neurologic baseline alert, following commands, no lateralizing neurologic deficits.  History from patient limited due to history of dementia.  Review of EMR does not indicate any prescribed anticoagulant medications.  CT head and cervical spine demonstrated trace subarachnoid hemorrhage.  Goal blood pressure less than 160 systolic.  Ordered repeat CT " head to be performed 6 hours after initial.  Discussed with neurosurgeon Dr. Lao who agreed with plan.  At time of shift change repeat CT head was pending.  Did not repair scalp wound until after repeat imaging as it appeared hemostatic and did not want to risk scatter artifact from staples that may limit interpretation of repeat imaging.      Diagnoses as of 06/22/25 0300   Subarachnoid hematoma, without loss of consciousness, initial encounter (Multi)   Laceration of scalp, initial encounter            Arya Moore MD  06/24/25 0500

## 2025-06-23 VITALS
WEIGHT: 150 LBS | SYSTOLIC BLOOD PRESSURE: 121 MMHG | OXYGEN SATURATION: 98 % | RESPIRATION RATE: 16 BRPM | TEMPERATURE: 97 F | HEIGHT: 65 IN | DIASTOLIC BLOOD PRESSURE: 98 MMHG | HEART RATE: 92 BPM | BODY MASS INDEX: 24.99 KG/M2

## 2025-06-23 NOTE — DISCHARGE INSTRUCTIONS
Patient was seen in the emergency department for some confusion after a fall in which she had a very small head bleed.  Patient's labs were normal in the emergency department.  Patient had a normal EKG as well as a normal heart enzyme.  We did repeat the CT scan that did not show any change in her head bleed.  We were unable to get urine at this time given patient was unable to pee for us.  Patient was back to her baseline mental status for us and therefore we feel she is appropriate for discharge.  If there is ongoing concern for confusion you may bring her back to the emergency department, or obtain a urine sample if your doctor is able to send one.  If she develops a fever, nausea or vomiting that prevents her from eating or drinking or ongoing/worsening confusion please return the emergency department.

## 2025-06-24 NOTE — ED PROCEDURE NOTE
Procedure  Critical Care    Performed by: Arya Moore MD  Authorized by: Arya Moore MD    Critical care provider statement:     Critical care time (minutes):  30    Critical care time was exclusive of:  Separately billable procedures and treating other patients and teaching time    Critical care was necessary to treat or prevent imminent or life-threatening deterioration of the following conditions:  CNS failure or compromise    Critical care was time spent personally by me on the following activities:  Development of treatment plan with patient or surrogate, evaluation of patient's response to treatment, examination of patient, obtaining history from patient or surrogate, ordering and performing treatments and interventions, ordering and review of radiographic studies, pulse oximetry, re-evaluation of patient's condition, review of old charts and discussions with consultants               Arya Moore MD  06/24/25 3228

## 2025-06-28 LAB
ATRIAL RATE: 93 BPM
P AXIS: 14 DEGREES
P OFFSET: 203 MS
P ONSET: 131 MS
PR INTERVAL: 198 MS
Q ONSET: 230 MS
QRS COUNT: 16 BEATS
QRS DURATION: 76 MS
QT INTERVAL: 352 MS
QTC CALCULATION(BAZETT): 437 MS
QTC FREDERICIA: 407 MS
R AXIS: -7 DEGREES
T AXIS: 128 DEGREES
T OFFSET: 406 MS
VENTRICULAR RATE: 93 BPM

## 2025-07-29 LAB
ATRIAL RATE: 80 BPM
P AXIS: 5 DEGREES
P OFFSET: 199 MS
P ONSET: 129 MS
PR INTERVAL: 196 MS
Q ONSET: 227 MS
QRS COUNT: 12 BEATS
QRS DURATION: 86 MS
QT INTERVAL: 410 MS
QTC CALCULATION(BAZETT): 472 MS
QTC FREDERICIA: 451 MS
R AXIS: 0 DEGREES
T AXIS: 112 DEGREES
T OFFSET: 432 MS
VENTRICULAR RATE: 80 BPM

## (undated) DEVICE — 3M™ STERI-DRAPE™ U-DRAPE 1015: Brand: STERI-DRAPE™

## (undated) DEVICE — COUNTER NDL 40 COUNT HLD 70 FOAM BLK ADH W/ MAG

## (undated) DEVICE — MARKER SURG SKIN GENTIAN VLT REG TIP W/ 6IN RUL DYNJSM01

## (undated) DEVICE — SUTURE VCRL SZ 0 L36IN ABSRB UD L36MM CT-1 1/2 CIR J946H

## (undated) DEVICE — DRAPE,HIP,W/POUCHES,STERILE: Brand: MEDLINE

## (undated) DEVICE — GLOVE ORANGE PI 7 1/2   MSG9075

## (undated) DEVICE — SYRINGE IRRIG 60ML SFT PLIABLE BLB EZ TO GRP 1 HND USE W/

## (undated) DEVICE — COVER LT HNDL BLU PLAS

## (undated) DEVICE — NEPTUNE E-SEP SMOKE EVACUATION PENCIL, COATED, 70MM BLADE, PUSH BUTTON SWITCH: Brand: NEPTUNE E-SEP

## (undated) DEVICE — PACK,BASIC: Brand: MEDLINE

## (undated) DEVICE — SUTURE ETHBND EXCEL SZ 2 L30IN NONABSORBABLE GRN L40MM V-37 MX69G

## (undated) DEVICE — DRESSING HYDROFIBER AQUACEL AG ADVANTAGE 3.5X10 IN

## (undated) DEVICE — APPLICATOR MEDICATED 26 CC SOLUTION HI LT ORNG CHLORAPREP

## (undated) DEVICE — BIT DRL L300MM DIA5MM CANN QUIK CPL ADJ STP REUSE

## (undated) DEVICE — TUBING, SUCTION, 9/32" X 12', STRAIGHT: Brand: MEDLINE INDUSTRIES, INC.

## (undated) DEVICE — Device: Brand: BOOT LINER, DISPOSABLE

## (undated) DEVICE — ADVANCED FEMORAL PRESSURIZER, MEDIUM

## (undated) DEVICE — STAPLER SKIN H3.9MM WIRE DIA0.58MM CRWN 6.9MM 35 STPL FIX

## (undated) DEVICE — BLADE,CARBON-STEEL,10,STRL,DISPOSABLE,TB: Brand: MEDLINE

## (undated) DEVICE — SUTURE VCRL SZ 1 L36IN ABSRB UD L36MM CT-1 1/2 CIR J947H

## (undated) DEVICE — C-ARM: Brand: UNBRANDED

## (undated) DEVICE — FAN SPRAY KIT: Brand: PULSAVAC®

## (undated) DEVICE — Device: Brand: COVER, PERINEAL POST, 12 PK

## (undated) DEVICE — TOWEL,OR,DSP,ST,BLUE,STD,4/PK,20PK/CS: Brand: MEDLINE

## (undated) DEVICE — TOTAL HIP: Brand: MEDLINE INDUSTRIES, INC.

## (undated) DEVICE — GUIDEWIRE ORTH L300MM DIA2.8MM S STL THRD SHRP TRCR TIP FOR

## (undated) DEVICE — SINGLE PORT MANIFOLD: Brand: NEPTUNE 2

## (undated) DEVICE — DRAPE,ISOLATION,INCISE,INVISISHIELD: Brand: MEDLINE

## (undated) DEVICE — SUTURE VCRL SZ 2-0 L36IN ABSRB UD L36MM CT-1 1/2 CIR J945H

## (undated) DEVICE — GLOVE ORANGE PI 7   MSG9070

## (undated) DEVICE — LABEL MED MINI W/ MARKER

## (undated) DEVICE — Device: Brand: PROTECTORS, LEG SPAR BALL JOINT, 12/PR

## (undated) DEVICE — GOWN,AURORA,NONREINFORCED,LARGE: Brand: MEDLINE

## (undated) DEVICE — SPONGE,LAP,18"X18",DLX,XR,ST,5/PK,40/PK: Brand: MEDLINE

## (undated) DEVICE — GLOVE ORTHO 7 1/2   MSG9475

## (undated) DEVICE — GOWN,SIRUS,POLYRNF,BRTHSLV,XLN/XL,20/CS: Brand: MEDLINE

## (undated) DEVICE — DRESSING HYDROFIBER AQUACEL AG ADVANTAGE 3.5X6 IN

## (undated) DEVICE — BLADE SAW W098XL276IN THK0025IN CUT THK0039IN REPL SAG